# Patient Record
Sex: MALE | Race: BLACK OR AFRICAN AMERICAN | NOT HISPANIC OR LATINO | Employment: FULL TIME | URBAN - METROPOLITAN AREA
[De-identification: names, ages, dates, MRNs, and addresses within clinical notes are randomized per-mention and may not be internally consistent; named-entity substitution may affect disease eponyms.]

---

## 2023-11-19 ENCOUNTER — HOSPITAL ENCOUNTER (EMERGENCY)
Facility: HOSPITAL | Age: 33
Discharge: HOME | End: 2023-11-19
Payer: MEDICARE

## 2023-11-19 ENCOUNTER — APPOINTMENT (OUTPATIENT)
Dept: RADIOLOGY | Facility: HOSPITAL | Age: 33
End: 2023-11-19
Payer: MEDICARE

## 2023-11-19 VITALS
TEMPERATURE: 97.9 F | OXYGEN SATURATION: 97 % | WEIGHT: 225 LBS | BODY MASS INDEX: 30.48 KG/M2 | HEIGHT: 72 IN | DIASTOLIC BLOOD PRESSURE: 108 MMHG | RESPIRATION RATE: 16 BRPM | SYSTOLIC BLOOD PRESSURE: 166 MMHG | HEART RATE: 87 BPM

## 2023-11-19 DIAGNOSIS — S62.101A CLOSED FRACTURE OF RIGHT WRIST, INITIAL ENCOUNTER: Primary | ICD-10-CM

## 2023-11-19 PROCEDURE — 73130 X-RAY EXAM OF HAND: CPT | Mod: RT,FY

## 2023-11-19 PROCEDURE — 73110 X-RAY EXAM OF WRIST: CPT | Mod: RT,FY

## 2023-11-19 PROCEDURE — 29125 APPL SHORT ARM SPLINT STATIC: CPT | Mod: RT

## 2023-11-19 PROCEDURE — 73130 X-RAY EXAM OF HAND: CPT | Mod: RIGHT SIDE | Performed by: RADIOLOGY

## 2023-11-19 PROCEDURE — 73090 X-RAY EXAM OF FOREARM: CPT | Mod: RIGHT SIDE | Performed by: RADIOLOGY

## 2023-11-19 PROCEDURE — 2500000001 HC RX 250 WO HCPCS SELF ADMINISTERED DRUGS (ALT 637 FOR MEDICARE OP): Performed by: HOME HEALTH

## 2023-11-19 PROCEDURE — 94760 N-INVAS EAR/PLS OXIMETRY 1: CPT

## 2023-11-19 PROCEDURE — 99285 EMERGENCY DEPT VISIT HI MDM: CPT | Mod: 25

## 2023-11-19 PROCEDURE — 73090 X-RAY EXAM OF FOREARM: CPT | Mod: RT

## 2023-11-19 PROCEDURE — 99284 EMERGENCY DEPT VISIT MOD MDM: CPT | Mod: 25 | Performed by: HOME HEALTH

## 2023-11-19 PROCEDURE — 73110 X-RAY EXAM OF WRIST: CPT | Mod: RIGHT SIDE | Performed by: RADIOLOGY

## 2023-11-19 RX ORDER — NAPROXEN 500 MG/1
500 TABLET ORAL
Qty: 30 TABLET | Refills: 0 | Status: SHIPPED | OUTPATIENT
Start: 2023-11-19 | End: 2023-12-04

## 2023-11-19 RX ORDER — HYDROCODONE BITARTRATE AND ACETAMINOPHEN 5; 325 MG/1; MG/1
1 TABLET ORAL ONCE
Status: COMPLETED | OUTPATIENT
Start: 2023-11-19 | End: 2023-11-19

## 2023-11-19 RX ORDER — HYDROCODONE BITARTRATE AND ACETAMINOPHEN 5; 325 MG/1; MG/1
1 TABLET ORAL EVERY 6 HOURS PRN
Qty: 9 TABLET | Refills: 0 | Status: SHIPPED | OUTPATIENT
Start: 2023-11-19 | End: 2023-11-22

## 2023-11-19 RX ADMIN — HYDROCODONE BITARTRATE AND ACETAMINOPHEN 1 TABLET: 5; 325 TABLET ORAL at 17:30

## 2023-11-19 ASSESSMENT — LIFESTYLE VARIABLES
EVER FELT BAD OR GUILTY ABOUT YOUR DRINKING: NO
REASON UNABLE TO ASSESS: NO
HAVE YOU EVER FELT YOU SHOULD CUT DOWN ON YOUR DRINKING: NO
HAVE PEOPLE ANNOYED YOU BY CRITICIZING YOUR DRINKING: NO
EVER HAD A DRINK FIRST THING IN THE MORNING TO STEADY YOUR NERVES TO GET RID OF A HANGOVER: NO

## 2023-11-19 ASSESSMENT — PAIN - FUNCTIONAL ASSESSMENT: PAIN_FUNCTIONAL_ASSESSMENT: 0-10

## 2023-11-19 ASSESSMENT — PAIN DESCRIPTION - LOCATION: LOCATION: HAND

## 2023-11-19 ASSESSMENT — PAIN SCALES - GENERAL: PAINLEVEL_OUTOF10: 10 - WORST POSSIBLE PAIN

## 2023-11-19 ASSESSMENT — PAIN DESCRIPTION - ORIENTATION: ORIENTATION: RIGHT

## 2023-11-19 ASSESSMENT — PAIN DESCRIPTION - FREQUENCY: FREQUENCY: CONSTANT/CONTINUOUS

## 2023-11-19 ASSESSMENT — COLUMBIA-SUICIDE SEVERITY RATING SCALE - C-SSRS
6. HAVE YOU EVER DONE ANYTHING, STARTED TO DO ANYTHING, OR PREPARED TO DO ANYTHING TO END YOUR LIFE?: NO
2. HAVE YOU ACTUALLY HAD ANY THOUGHTS OF KILLING YOURSELF?: NO
1. IN THE PAST MONTH, HAVE YOU WISHED YOU WERE DEAD OR WISHED YOU COULD GO TO SLEEP AND NOT WAKE UP?: NO

## 2023-11-19 ASSESSMENT — PAIN DESCRIPTION - PAIN TYPE: TYPE: ACUTE PAIN

## 2023-11-19 NOTE — ED PROVIDER NOTES
HPI   Chief Complaint   Patient presents with    Hand Injury     Pt fell down a few steps and injured his right hand. Hand is swollen and bruised.       Patient is a 33-year-old male presenting to the ED with right hand pain and right wrist pain.  Patient states that a couple hours ago prior to coming to the ED he was walking on the stairs and states that he took the wrong step and fell down 1 stair.  States that he landed on his hand.  Believes that he had a fist at the time that he landed on his hand.  Currently endorsing significant pain.  No numbness or tingling distally.  No other injuries during the fall.  Denies LOC, hitting his head or anticoagulation.                          No data recorded                Patient History   No past medical history on file.  No past surgical history on file.  No family history on file.  Social History     Tobacco Use    Smoking status: Not on file    Smokeless tobacco: Not on file   Substance Use Topics    Alcohol use: Not on file    Drug use: Not on file       Physical Exam   ED Triage Vitals [11/19/23 1656]   Temp Heart Rate Resp BP   36.6 °C (97.9 °F) 87 16 (!) 166/108      SpO2 Temp Source Heart Rate Source Patient Position   97 % Temporal Monitor Sitting      BP Location FiO2 (%)     Left arm --       Physical Exam  Vitals reviewed.   Constitutional:       Appearance: Normal appearance.   HENT:      Head: Normocephalic.      Nose: Nose normal.      Mouth/Throat:      Mouth: Mucous membranes are moist.      Pharynx: Oropharynx is clear.   Eyes:      Extraocular Movements: Extraocular movements intact.      Pupils: Pupils are equal, round, and reactive to light.   Cardiovascular:      Rate and Rhythm: Normal rate and regular rhythm.      Pulses: Normal pulses.      Heart sounds: Normal heart sounds.   Pulmonary:      Effort: Pulmonary effort is normal.      Breath sounds: Normal breath sounds. No wheezing, rhonchi or rales.   Musculoskeletal:         General: Normal  range of motion.      Cervical back: Normal range of motion.      Comments: Visible swelling along the right wrist and right hand.  Active range of motion is limited due to pain.  Reproducible tenderness when palpating along the wrist joint and metacarpals of the right hand.  Active range of motion digits 1 through 5 but limited due to pain.  No snuffbox tenderness.  Cap refill less than 2 seconds.  Radial pulse 2+.   Skin:     General: Skin is warm.      Capillary Refill: Capillary refill takes less than 2 seconds.   Neurological:      General: No focal deficit present.      Mental Status: He is alert. Mental status is at baseline.   Psychiatric:         Mood and Affect: Mood normal.         Behavior: Behavior normal.         ED Course & MDM   Diagnoses as of 11/19/23 1842   Closed fracture of right wrist, initial encounter     XR hand right 3+ views   Final Result   Mildly offset acute appearing fractures of the distal radius and   ulnar styloid.             MACRO:   None        Signed by: Ac Blandon 11/19/2023 6:09 PM   Dictation workstation:   [id number]      XR wrist right 3+ views   Final Result   Mildly offset acute appearing fractures of the distal radius and   ulnar styloid.             MACRO:   None        Signed by: Ac Blandon 11/19/2023 6:09 PM   Dictation workstation:   [id number]      XR forearm right 2 views   Final Result   Mildly offset acute appearing fractures of the distal radius and   ulnar styloid.             MACRO:   None        Signed by: Ac Blandon 11/19/2023 6:09 PM   Dictation workstation:   [id number]          Medical Decision Making  Independent Historians:   Patient    MDM:   33-year-old male presenting to the ED with right wrist injury. Shared decision making was made.  Patient given [place] p.o. for pain control. Patient states that a couple hours prior to come to the ED he was walking up the stairs when he took the wrong step and fell.  Fell down 1 stair landing  directly on his right hand.  States that his fist was closed when he landed on it.  No other injuries. denies hitting his head, LOC or anticoagulation.  Denies numbness or tingling distally.  On exam patient is resting comfortably.  No tachycardia and afebrile.  Patient has obvious swelling along the right hand and right wrist.  Active range of motion of the right wrist is limited due to pain.  Full active range of motion in digits 1 through 5.  Radial, medial and ulnar nerves intact.  Cap refill less than 2 seconds.  Radial pulse 2+.  X-ray ordered to further evaluate patient's symptoms.      Radiologist interpretation of x-ray suggest mild offset acute appearing fractures of the distal radius and ulnar styloid.  Discussed this finding with the patient which she understands.   patient placed in sugar-tong splint applied by paramedics.  I did reassess the patient after placement of the splint which she was neurovascular intact distally.  Patient reports improvement of his symptoms after splint placement.  Patient was sent home with prescription of [place] p.o. for breakthrough pain peer reviewed the patient's OARRS with no active prescriptions at this time.  Send patient home with prescription of naproxen.  Advised RICE therapy.  Advised patient to [facility] as he may potentially need surgery due to the mild displacement.  Patient agrees this plan is no further questions.  Patient stable for discharge as he is neurovascular intact distally.  Diagnosed patient with closed fracture of right wrist.    DDx/Differential:  Fracture, contusion, sprain    Diagnosis: closed fracture of right wrist        Dictation Disclaimer: Due to voice recognition software, sound alike and misspelled words may be contained in documentation. If you have any questions please contact me.            Procedure  Procedures     Alessio Chapman PA-C  11/19/23 0479

## 2023-11-20 ENCOUNTER — OFFICE VISIT (OUTPATIENT)
Dept: ORTHOPEDIC SURGERY | Facility: CLINIC | Age: 33
End: 2023-11-20
Payer: MEDICARE

## 2023-11-20 DIAGNOSIS — S52.509A FRACTURE OF DISTAL END OF RADIUS WITHOUT ADDITIONAL FRACTURE: ICD-10-CM

## 2023-11-20 PROCEDURE — 99214 OFFICE O/P EST MOD 30 MIN: CPT | Mod: 57 | Performed by: STUDENT IN AN ORGANIZED HEALTH CARE EDUCATION/TRAINING PROGRAM

## 2023-11-20 PROCEDURE — 99204 OFFICE O/P NEW MOD 45 MIN: CPT | Performed by: STUDENT IN AN ORGANIZED HEALTH CARE EDUCATION/TRAINING PROGRAM

## 2023-11-20 NOTE — PROGRESS NOTES
History of Present Illness:  Presents for evaluation of right wrist.  The patient sustained an acute injury and notes pain and swelling.    The pain is sharp in nature, severe, worse with movement and better with rest.    Had mechanical fall down 1 stair yesterday.  Denies pain to remainder extremities. Was chasing son. 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    Review of Systems   GENERAL: Negative for malaise, significant weight loss, fever  MUSCULOSKELETAL: see HPI  NEURO:  Negative    The patient's past medical history, family history, social history, and review of systems were reviewed. History is otherwise negative except as stated in the HPI.    Physical Examination:  The patient appears to be their stated age, is in no apparent distress, and is oriented x3. The patients mood and affect are appropriate. The patients gait is normal. The examination of the limb in question was performed in comparison to the contralateral limb.  On musculoskeletal examination, the patient has full elbow range of motion. In regards to the wrist, there is swelling with some deformity. There is ecchymosis of the wrist, but the skin is intact. Range of motion and strength are limited secondary to pain. There is tenderness to palpation of the distal radius. There is no obvious tenderness to palpation about the scaphoid or the SL interval. Sensation and motor function are intact in the radial, ulnar, and median nerve distribution. The patient has intact flexor and extensor function, but has difficulty making a full fist secondary to pain. The hand itself is warm and well perfused. The contralateral hand and wrist are normal to inspection, range of motion, stability, and strength.      Imaging:  AP, lateral, and oblique radiographs of the right wrist demonstrate a displaced radial styloid fracture.    Assessment:  Patient with a displaced radial styloid fracture    Plan:  I had a long conversation with the patient regarding their distal radius  fracture. I discussed the risks/benefits of both non-operative and operative management. Based on the displacement and instability of the fracture, the patient has elected operative fixation. I reviewed the details of the operation and of the post-operative course. The benefit of surgery would be restoration of alignment to allow for normal function. The risks of surgery include, but are not limited to, infection, bleeding, nerve/vessel/tendon injury, stiffness, malunion, nonunion, hardware complications, and anesthetic complications. The patient understood the risks/benefits and consented to surgery. I will schedule them in the near future. Today, I have placed them back in a splint and have advised strict elevation until the time of surgery. I have answered all of their questions regarding the procedure. Out of work for 8 weeks.    Jodie Ramirez MD

## 2023-11-24 ENCOUNTER — HOSPITAL ENCOUNTER (OUTPATIENT)
Age: 33
Discharge: HOME | End: 2023-11-24
Attending: STUDENT IN AN ORGANIZED HEALTH CARE EDUCATION/TRAINING PROGRAM | Admitting: STUDENT IN AN ORGANIZED HEALTH CARE EDUCATION/TRAINING PROGRAM
Payer: COMMERCIAL

## 2023-11-24 ENCOUNTER — ANESTHESIA EVENT (OUTPATIENT)
Dept: OPERATING ROOM | Age: 33
End: 2023-11-24
Payer: COMMERCIAL

## 2023-11-24 ENCOUNTER — APPOINTMENT (OUTPATIENT)
Dept: GENERAL RADIOLOGY | Age: 33
End: 2023-11-24
Attending: STUDENT IN AN ORGANIZED HEALTH CARE EDUCATION/TRAINING PROGRAM
Payer: COMMERCIAL

## 2023-11-24 ENCOUNTER — ANESTHESIA (OUTPATIENT)
Dept: OPERATING ROOM | Age: 33
End: 2023-11-24
Payer: COMMERCIAL

## 2023-11-24 VITALS
DIASTOLIC BLOOD PRESSURE: 95 MMHG | HEIGHT: 72 IN | TEMPERATURE: 97.6 F | SYSTOLIC BLOOD PRESSURE: 138 MMHG | BODY MASS INDEX: 30.48 KG/M2 | RESPIRATION RATE: 18 BRPM | WEIGHT: 225 LBS | OXYGEN SATURATION: 99 % | HEART RATE: 70 BPM

## 2023-11-24 DIAGNOSIS — R52 PAIN: ICD-10-CM

## 2023-11-24 DIAGNOSIS — S52.571A OTHER CLOSED INTRA-ARTICULAR FRACTURE OF DISTAL END OF RIGHT RADIUS, INITIAL ENCOUNTER: Primary | ICD-10-CM

## 2023-11-24 PROBLEM — Z41.9 SURGERY, ELECTIVE: Status: ACTIVE | Noted: 2023-11-24

## 2023-11-24 PROCEDURE — C1769 GUIDE WIRE: HCPCS | Performed by: STUDENT IN AN ORGANIZED HEALTH CARE EDUCATION/TRAINING PROGRAM

## 2023-11-24 PROCEDURE — 6370000000 HC RX 637 (ALT 250 FOR IP): Performed by: ANESTHESIOLOGY

## 2023-11-24 PROCEDURE — 3700000001 HC ADD 15 MINUTES (ANESTHESIA): Performed by: STUDENT IN AN ORGANIZED HEALTH CARE EDUCATION/TRAINING PROGRAM

## 2023-11-24 PROCEDURE — 2500000003 HC RX 250 WO HCPCS: Performed by: STUDENT IN AN ORGANIZED HEALTH CARE EDUCATION/TRAINING PROGRAM

## 2023-11-24 PROCEDURE — A4217 STERILE WATER/SALINE, 500 ML: HCPCS | Performed by: STUDENT IN AN ORGANIZED HEALTH CARE EDUCATION/TRAINING PROGRAM

## 2023-11-24 PROCEDURE — 2709999900 HC NON-CHARGEABLE SUPPLY: Performed by: STUDENT IN AN ORGANIZED HEALTH CARE EDUCATION/TRAINING PROGRAM

## 2023-11-24 PROCEDURE — 6360000002 HC RX W HCPCS: Performed by: STUDENT IN AN ORGANIZED HEALTH CARE EDUCATION/TRAINING PROGRAM

## 2023-11-24 PROCEDURE — 6360000002 HC RX W HCPCS: Performed by: REGISTERED NURSE

## 2023-11-24 PROCEDURE — 2580000003 HC RX 258: Performed by: STUDENT IN AN ORGANIZED HEALTH CARE EDUCATION/TRAINING PROGRAM

## 2023-11-24 PROCEDURE — 6360000002 HC RX W HCPCS: Performed by: ANESTHESIOLOGY

## 2023-11-24 PROCEDURE — 7100000001 HC PACU RECOVERY - ADDTL 15 MIN: Performed by: STUDENT IN AN ORGANIZED HEALTH CARE EDUCATION/TRAINING PROGRAM

## 2023-11-24 PROCEDURE — 7100000010 HC PHASE II RECOVERY - FIRST 15 MIN: Performed by: STUDENT IN AN ORGANIZED HEALTH CARE EDUCATION/TRAINING PROGRAM

## 2023-11-24 PROCEDURE — 3700000000 HC ANESTHESIA ATTENDED CARE: Performed by: STUDENT IN AN ORGANIZED HEALTH CARE EDUCATION/TRAINING PROGRAM

## 2023-11-24 PROCEDURE — C1713 ANCHOR/SCREW BN/BN,TIS/BN: HCPCS | Performed by: STUDENT IN AN ORGANIZED HEALTH CARE EDUCATION/TRAINING PROGRAM

## 2023-11-24 PROCEDURE — 2720000010 HC SURG SUPPLY STERILE: Performed by: STUDENT IN AN ORGANIZED HEALTH CARE EDUCATION/TRAINING PROGRAM

## 2023-11-24 PROCEDURE — 3600000014 HC SURGERY LEVEL 4 ADDTL 15MIN: Performed by: STUDENT IN AN ORGANIZED HEALTH CARE EDUCATION/TRAINING PROGRAM

## 2023-11-24 PROCEDURE — 3600000004 HC SURGERY LEVEL 4 BASE: Performed by: STUDENT IN AN ORGANIZED HEALTH CARE EDUCATION/TRAINING PROGRAM

## 2023-11-24 PROCEDURE — 7100000011 HC PHASE II RECOVERY - ADDTL 15 MIN: Performed by: STUDENT IN AN ORGANIZED HEALTH CARE EDUCATION/TRAINING PROGRAM

## 2023-11-24 PROCEDURE — 25608 OPTX DST RD XART FX/EPI SEP2: CPT | Performed by: STUDENT IN AN ORGANIZED HEALTH CARE EDUCATION/TRAINING PROGRAM

## 2023-11-24 PROCEDURE — 7100000000 HC PACU RECOVERY - FIRST 15 MIN: Performed by: STUDENT IN AN ORGANIZED HEALTH CARE EDUCATION/TRAINING PROGRAM

## 2023-11-24 DEVICE — IMPLANTABLE DEVICE: Type: IMPLANTABLE DEVICE | Status: FUNCTIONAL

## 2023-11-24 RX ORDER — SODIUM CHLORIDE 0.9 % (FLUSH) 0.9 %
5-40 SYRINGE (ML) INJECTION EVERY 12 HOURS SCHEDULED
Status: DISCONTINUED | OUTPATIENT
Start: 2023-11-24 | End: 2023-11-24 | Stop reason: HOSPADM

## 2023-11-24 RX ORDER — NAPROXEN 500 MG/1
500 TABLET ORAL 2 TIMES DAILY WITH MEALS
Qty: 30 TABLET | Refills: 0 | COMMUNITY
Start: 2023-11-19 | End: 2023-12-04

## 2023-11-24 RX ORDER — SODIUM CHLORIDE 9 MG/ML
INJECTION, SOLUTION INTRAVENOUS PRN
Status: DISCONTINUED | OUTPATIENT
Start: 2023-11-24 | End: 2023-11-24 | Stop reason: HOSPADM

## 2023-11-24 RX ORDER — ONDANSETRON 2 MG/ML
4 INJECTION INTRAMUSCULAR; INTRAVENOUS
Status: DISCONTINUED | OUTPATIENT
Start: 2023-11-24 | End: 2023-11-24 | Stop reason: HOSPADM

## 2023-11-24 RX ORDER — HYDROCODONE BITARTRATE AND ACETAMINOPHEN 5; 325 MG/1; MG/1
1 TABLET ORAL EVERY 6 HOURS PRN
Status: ON HOLD | COMMUNITY
End: 2023-11-24 | Stop reason: HOSPADM

## 2023-11-24 RX ORDER — PROPOFOL 10 MG/ML
INJECTION, EMULSION INTRAVENOUS PRN
Status: DISCONTINUED | OUTPATIENT
Start: 2023-11-24 | End: 2023-11-24 | Stop reason: SDUPTHER

## 2023-11-24 RX ORDER — MEPERIDINE HYDROCHLORIDE 25 MG/ML
12.5 INJECTION INTRAMUSCULAR; INTRAVENOUS; SUBCUTANEOUS
Status: DISCONTINUED | OUTPATIENT
Start: 2023-11-24 | End: 2023-11-24 | Stop reason: HOSPADM

## 2023-11-24 RX ORDER — FENTANYL CITRATE 50 UG/ML
INJECTION, SOLUTION INTRAMUSCULAR; INTRAVENOUS PRN
Status: DISCONTINUED | OUTPATIENT
Start: 2023-11-24 | End: 2023-11-24 | Stop reason: SDUPTHER

## 2023-11-24 RX ORDER — METOCLOPRAMIDE HYDROCHLORIDE 5 MG/ML
10 INJECTION INTRAMUSCULAR; INTRAVENOUS
Status: DISCONTINUED | OUTPATIENT
Start: 2023-11-24 | End: 2023-11-24 | Stop reason: HOSPADM

## 2023-11-24 RX ORDER — ONDANSETRON 4 MG/1
4 TABLET, FILM COATED ORAL 3 TIMES DAILY PRN
Qty: 15 TABLET | Refills: 0 | Status: SHIPPED | OUTPATIENT
Start: 2023-11-24

## 2023-11-24 RX ORDER — MAGNESIUM HYDROXIDE 1200 MG/15ML
LIQUID ORAL CONTINUOUS PRN
Status: DISCONTINUED | OUTPATIENT
Start: 2023-11-24 | End: 2023-11-24 | Stop reason: HOSPADM

## 2023-11-24 RX ORDER — SODIUM CHLORIDE 9 MG/ML
INJECTION, SOLUTION INTRAVENOUS CONTINUOUS
Status: DISCONTINUED | OUTPATIENT
Start: 2023-11-24 | End: 2023-11-24 | Stop reason: HOSPADM

## 2023-11-24 RX ORDER — OXYCODONE HYDROCHLORIDE 5 MG/1
5 TABLET ORAL
Status: COMPLETED | OUTPATIENT
Start: 2023-11-24 | End: 2023-11-24

## 2023-11-24 RX ORDER — BUPIVACAINE HYDROCHLORIDE 5 MG/ML
INJECTION, SOLUTION EPIDURAL; INTRACAUDAL PRN
Status: DISCONTINUED | OUTPATIENT
Start: 2023-11-24 | End: 2023-11-24 | Stop reason: HOSPADM

## 2023-11-24 RX ORDER — SODIUM CHLORIDE 0.9 % (FLUSH) 0.9 %
5-40 SYRINGE (ML) INJECTION PRN
Status: DISCONTINUED | OUTPATIENT
Start: 2023-11-24 | End: 2023-11-24 | Stop reason: HOSPADM

## 2023-11-24 RX ORDER — FENTANYL CITRATE 0.05 MG/ML
50 INJECTION, SOLUTION INTRAMUSCULAR; INTRAVENOUS EVERY 10 MIN PRN
Status: DISCONTINUED | OUTPATIENT
Start: 2023-11-24 | End: 2023-11-24 | Stop reason: HOSPADM

## 2023-11-24 RX ORDER — MIDAZOLAM HYDROCHLORIDE 1 MG/ML
INJECTION INTRAMUSCULAR; INTRAVENOUS PRN
Status: DISCONTINUED | OUTPATIENT
Start: 2023-11-24 | End: 2023-11-24 | Stop reason: SDUPTHER

## 2023-11-24 RX ORDER — DIPHENHYDRAMINE HYDROCHLORIDE 50 MG/ML
12.5 INJECTION INTRAMUSCULAR; INTRAVENOUS
Status: DISCONTINUED | OUTPATIENT
Start: 2023-11-24 | End: 2023-11-24 | Stop reason: HOSPADM

## 2023-11-24 RX ORDER — OXYCODONE HYDROCHLORIDE AND ACETAMINOPHEN 5; 325 MG/1; MG/1
1 TABLET ORAL EVERY 6 HOURS PRN
Qty: 20 TABLET | Refills: 0 | Status: SHIPPED | OUTPATIENT
Start: 2023-11-24 | End: 2023-12-01

## 2023-11-24 RX ORDER — LIDOCAINE HYDROCHLORIDE AND EPINEPHRINE 10; 10 MG/ML; UG/ML
INJECTION, SOLUTION INFILTRATION; PERINEURAL PRN
Status: DISCONTINUED | OUTPATIENT
Start: 2023-11-24 | End: 2023-11-24 | Stop reason: HOSPADM

## 2023-11-24 RX ADMIN — PROPOFOL 100 MG: 10 INJECTION, EMULSION INTRAVENOUS at 11:15

## 2023-11-24 RX ADMIN — MIDAZOLAM HYDROCHLORIDE 2 MG: 1 INJECTION, SOLUTION INTRAMUSCULAR; INTRAVENOUS at 10:53

## 2023-11-24 RX ADMIN — HYDROMORPHONE HYDROCHLORIDE 0.5 MG: 1 INJECTION, SOLUTION INTRAMUSCULAR; INTRAVENOUS; SUBCUTANEOUS at 12:05

## 2023-11-24 RX ADMIN — SODIUM CHLORIDE: 9 INJECTION, SOLUTION INTRAVENOUS at 10:53

## 2023-11-24 RX ADMIN — FENTANYL CITRATE 50 MCG: 50 INJECTION, SOLUTION INTRAMUSCULAR; INTRAVENOUS at 11:03

## 2023-11-24 RX ADMIN — PROPOFOL 100 MG: 10 INJECTION, EMULSION INTRAVENOUS at 11:07

## 2023-11-24 RX ADMIN — HYDROMORPHONE HYDROCHLORIDE 0.5 MG: 1 INJECTION, SOLUTION INTRAMUSCULAR; INTRAVENOUS; SUBCUTANEOUS at 11:46

## 2023-11-24 RX ADMIN — PROPOFOL 100 MG: 10 INJECTION, EMULSION INTRAVENOUS at 10:56

## 2023-11-24 RX ADMIN — FENTANYL CITRATE 50 MCG: 50 INJECTION, SOLUTION INTRAMUSCULAR; INTRAVENOUS at 10:53

## 2023-11-24 RX ADMIN — OXYCODONE 5 MG: 5 TABLET ORAL at 12:51

## 2023-11-24 RX ADMIN — CEFAZOLIN 2000 MG: 2 INJECTION, POWDER, FOR SOLUTION INTRAMUSCULAR; INTRAVENOUS at 11:00

## 2023-11-24 RX ADMIN — PROPOFOL 100 MCG/KG/MIN: 10 INJECTION, EMULSION INTRAVENOUS at 10:55

## 2023-11-24 ASSESSMENT — PAIN SCALES - GENERAL
PAINLEVEL_OUTOF10: 6
PAINLEVEL_OUTOF10: 7
PAINLEVEL_OUTOF10: 7
PAINLEVEL_OUTOF10: 8
PAINLEVEL_OUTOF10: 7
PAINLEVEL_OUTOF10: 5
PAINLEVEL_OUTOF10: 6

## 2023-11-24 ASSESSMENT — PAIN DESCRIPTION - ORIENTATION
ORIENTATION: RIGHT

## 2023-11-24 ASSESSMENT — PAIN DESCRIPTION - LOCATION
LOCATION: WRIST
LOCATION: HAND
LOCATION: WRIST

## 2023-11-24 ASSESSMENT — PAIN DESCRIPTION - DESCRIPTORS
DESCRIPTORS: THROBBING
DESCRIPTORS: JABBING;ACHING
DESCRIPTORS: THROBBING;SHARP

## 2023-11-24 ASSESSMENT — PAIN - FUNCTIONAL ASSESSMENT: PAIN_FUNCTIONAL_ASSESSMENT: 0-10

## 2023-11-24 ASSESSMENT — PAIN DESCRIPTION - PAIN TYPE: TYPE: SURGICAL PAIN

## 2023-11-24 NOTE — PROGRESS NOTES
CLINICAL PHARMACY NOTE: MEDS TO BEDS    Total # of Prescriptions Filled: 2   The following medications were delivered to the patient:  Ondansetron 4mg tab  Oxycodone/APAP 5-325mg tab    Additional Documentation:

## 2023-11-24 NOTE — OP NOTE
Operative Note      Patient: Charles Echeverria  YOB: 1990  MRN: [id number]    Date of Procedure: 11/24/2023    Pre-Op Diagnosis Codes:     * Closed fracture of distal end of right radius, unspecified fracture morphology, initial encounter [S52.501A]    Post-Op Diagnosis: Post-Op Diagnosis Codes:     * Closed fracture of distal end of right radius, unspecified fracture morphology, initial encounter [S52.501A]       Procedure(s):  RIGHT WRIST OPEN REDUCTION INTERNAL FIXATION RIGHT DISTAL RADIUS FRACTURE,    Surgeon(s):  Johnny Pope MD    Assistant:   First Assistant: Jose Tomas    Anesthesia: General    Estimated Blood Loss (mL): Minimal    Complications: None      Implants:  Implant Name Type Inv. Item Serial No.  Lot No. LRB No. Used Action   SCREW BNE LNG THRD 4X36 MM 14 MM SHELL HDLSS COMPR TI - [id number]  SCREW BNE LNG THRD 4X36 MM 14 MM SHELL HDLSS COMPR TI  [employer]  Right 1 Implanted   SCREW BNE HDLSS LNG THRD [id number] MM 13 MM SHELL ST SD TI GRN NS - [id number]  SCREW BNE HDLSS LNG THRD 7J25 MM 13 MM SHELL ST SD TI GRN NS  [employer]  Right 1 Implanted           Findings: Right intra-articular distal radius fracture, 2 parts    Detailed Description of Procedure:   Patient was brought to the operating room transferred the OR table. Hand table attached. Tourniquet placed into his upper arm. Timeout performed with the team verifying the right side as the correct side. Hand was prepped and draped in standard sterile fashion. A 2 cm incision was made over the radial styloid. Care was taken to ensure the radial sensory nerve was not in the field. A reduction was performed with manual traction and ulnar deviation. Under fluoroscopy a reduction was verified. A 1.6 K wire was placed from the radial styloid crossing the fracture into the proximal radial metaphysis. An additional K wire was then placed.   Both of these were measured and found to be 32 and 36mm

## 2023-11-24 NOTE — H&P
Update History & Physical    The patient's History and Physical of November 20, 2023 was reviewed with the patient and I examined the patient. There was no change. The surgical site was confirmed by the patient and me. Plan: The risks, benefits, expected outcome, and alternative to the recommended procedure have been discussed with the patient. Patient understands and wants to proceed with the procedure.      Electronically signed by Demetri Gaspar MD on 11/24/2023 at 10:31 AM

## 2023-11-24 NOTE — ANESTHESIA POSTPROCEDURE EVALUATION
Department of Anesthesiology  Postprocedure Note    Patient: Yohannes Kelley  MRN: [id number]  YOB: 1990  Date of evaluation: 11/24/2023      Procedure Summary     Date: 11/24/23 Room / Location: [address]    Anesthesia Start: 1053 Anesthesia Stop: 1143    Procedure: RIGHT WRIST OPEN REDUCTION INTERNAL FIXATION RIGHT DISTAL RADIUS FRACTURE, (Right) Diagnosis:       Closed fracture of distal end of right radius, unspecified fracture morphology, initial encounter      (Closed fracture of distal end of right radius, unspecified fracture morphology, initial encounter Zoë Giles)    Surgeons: Robert Bustamante MD Responsible Provider: King Daniel MD    Anesthesia Type: general ASA Status: 2          Anesthesia Type: No value filed.     Lisa Phase I: Lisa Score: 10    Lisa Phase II:        Anesthesia Post Evaluation    Patient location during evaluation: bedside  Patient participation: complete - patient participated  Level of consciousness: awake and awake and alert  Airway patency: patent  Nausea & Vomiting: no nausea and no vomiting  Complications: no  Cardiovascular status: blood pressure returned to baseline and hemodynamically stable  Respiratory status: acceptable  Hydration status: euvolemic  Pain management: adequate

## 2023-12-08 ENCOUNTER — APPOINTMENT (OUTPATIENT)
Dept: ORTHOPEDIC SURGERY | Facility: CLINIC | Age: 33
End: 2023-12-08
Payer: MEDICARE

## 2023-12-08 ENCOUNTER — ANCILLARY PROCEDURE (OUTPATIENT)
Dept: RADIOLOGY | Facility: CLINIC | Age: 33
End: 2023-12-08
Payer: MEDICARE

## 2023-12-08 ENCOUNTER — OFFICE VISIT (OUTPATIENT)
Dept: ORTHOPEDIC SURGERY | Facility: CLINIC | Age: 33
End: 2023-12-08
Payer: MEDICARE

## 2023-12-08 DIAGNOSIS — S52.509A FRACTURE OF DISTAL END OF RADIUS WITHOUT ADDITIONAL FRACTURE: ICD-10-CM

## 2023-12-08 PROCEDURE — 73110 X-RAY EXAM OF WRIST: CPT | Mod: RIGHT SIDE | Performed by: STUDENT IN AN ORGANIZED HEALTH CARE EDUCATION/TRAINING PROGRAM

## 2023-12-08 PROCEDURE — L3908 WHO COCK-UP NONMOLDE PRE OTS: HCPCS | Performed by: STUDENT IN AN ORGANIZED HEALTH CARE EDUCATION/TRAINING PROGRAM

## 2023-12-08 PROCEDURE — 99024 POSTOP FOLLOW-UP VISIT: CPT | Performed by: STUDENT IN AN ORGANIZED HEALTH CARE EDUCATION/TRAINING PROGRAM

## 2023-12-08 PROCEDURE — 73110 X-RAY EXAM OF WRIST: CPT | Mod: RT,FY

## 2023-12-08 NOTE — PROGRESS NOTES
S/p right distal radius ORIF. Doing well. Denies numbness or tingling.     Physical Examination:  The patient appears to be their stated age, is in no apparent distress, and is oriented x3. The patients mood and affect are appropriate. The patients gait is normal. The examination of the limb in question was performed in comparison to the contralateral limb.    Dressing removed  Incision c/d/I  AIN, PIN, ulnar intact  SILT A/R/U/M  Hand wwp    Radiographs  Demonstrate a right radial styloid status post reduction and screw placement without evidence of.  Hardware lucencies or fracture migration.    Assessment:  2  weeks post op    Plan:   Splint removed and brace applied today.  I would like him to wear this when out of the house.  I would like him to remove and work on range of motion as able.  OT also provided.  He will be out of work for an additional 4 weeks while we work through stiffness and while he is nonweightbearing.    Jodie Parsons MD

## 2023-12-08 NOTE — LETTER
December 8, 2023     Patient: Kulwinder Story Jr.   YOB: 1990   Date of Visit: 12/8/2023       To Whom It May Concern:    It is my medical opinion that Kulwinder Story should remain out of work until his follow up appointment .    If you have any questions or concerns, please don't hesitate to call.         Sincerely,        Jodie Ramirez MD    CC: No Recipients

## 2023-12-12 ENCOUNTER — APPOINTMENT (OUTPATIENT)
Dept: OCCUPATIONAL THERAPY | Facility: CLINIC | Age: 33
End: 2023-12-12
Payer: MEDICARE

## 2023-12-28 ENCOUNTER — EVALUATION (OUTPATIENT)
Dept: OCCUPATIONAL THERAPY | Facility: CLINIC | Age: 33
End: 2023-12-28
Payer: MEDICARE

## 2023-12-28 DIAGNOSIS — S52.509A FRACTURE OF DISTAL END OF RADIUS WITHOUT ADDITIONAL FRACTURE: ICD-10-CM

## 2023-12-28 DIAGNOSIS — S62.101D WRIST FRACTURE, CLOSED, RIGHT, WITH ROUTINE HEALING, SUBSEQUENT ENCOUNTER: Primary | ICD-10-CM

## 2023-12-28 PROCEDURE — 97140 MANUAL THERAPY 1/> REGIONS: CPT | Mod: GO | Performed by: OCCUPATIONAL THERAPIST

## 2023-12-28 PROCEDURE — 97165 OT EVAL LOW COMPLEX 30 MIN: CPT | Mod: GO | Performed by: OCCUPATIONAL THERAPIST

## 2023-12-28 PROCEDURE — 97110 THERAPEUTIC EXERCISES: CPT | Mod: GO | Performed by: OCCUPATIONAL THERAPIST

## 2023-12-28 ASSESSMENT — PAIN - FUNCTIONAL ASSESSMENT: PAIN_FUNCTIONAL_ASSESSMENT: 0-10

## 2023-12-28 ASSESSMENT — PAIN DESCRIPTION - DESCRIPTORS: DESCRIPTORS: SHARP

## 2023-12-28 ASSESSMENT — PAIN SCALES - GENERAL: PAINLEVEL_OUTOF10: 5 - MODERATE PAIN

## 2023-12-28 NOTE — PROGRESS NOTES
Occupational Therapy   Upper Extremity Evaluation Note    Patient Name: Kulwinder Story Jr.  MRN: [id number]  Referring  Physician: Dr. Jodie Ramirez  Time Calculation Time Calculation  Start Time: 0220  Stop Time: 0300  Time Calculation (min): 40 min     Current Problem  1. Wrist fracture, closed, right, with routine healing, subsequent encounter  Follow Up In Occupational Therapy      2. Fracture of distal end of radius without additional fracture  Referral to Occupational Therapy        DC OPEN RDL SHAFT FX CLOSED RAD/ULN JT DISLOCATE   RIGHT WRIST OPEN REDUCTION INTERNAL FIXATION RIGHT DISTAL RADIUS   FRACTURE, SUPINE, SYNTHES EQUIPMENT- CANNULATED SCREWS     OP Note: A 2 cm incision was made over the radial styloid. Care was taken to ensure the radial sensory nerve was not in the field. A reduction was performed with manual traction and ulnar deviation. Under fluoroscopy a reduction was verified. A 1.6 K wire was placed from the radial styloid crossing the fracture into the proximal radial metaphysis. An additional K wire was then placed. Both of these were measured and found to be 32 and 36mm , respectively . after this headless compression screws from the [employer] set were open and the first K wire was overdrilled. The 36 mm screw was placed under fluoroscopy. Appropriate compression across the fracture site was visualized. The second screw was then overdrilled and a 32 mm screw placed across the fracture site.     Date of Injury:  11/18/23  Date of Surgery: 11/24/23    Precautions: WBAT  Allergies:       None Reported          Insurance    Visit number: 1/8  Approved number of visits: BOA  Authorization required prior to treatment: Yes  Insurance Company: [employer]// BOA COPAY 0 DED 0 COVERAGE 100  OOP 0 AUTH REQ AFTER IE      Subjective  Patient would like to functionally improve ability to use hand tools at work, do push ups, and wash his back.     Pain  Pain Assessment  Pain Assessment:  0-10  Pain Score: 5 - Moderate pain  Pain Descriptors: Sharp        Objective  Hand Dominance: Right    Outcome Measures:   DASH Score: 36.36    ADLS/IADLS: Can't bend to wash back. Unable to complete gay hygiene.     Skin/ Wound/Scar: Flat scar, non-adherent    Sensation: WFL    Dexterity/ Coordination: To be evaluated as time permits at future session when medically appropriate.          AROM  Right Left   Forearm  Pronation/Supination 55/45 -      -   Wrist Extension/Flexion 45/45 -    Radial Deviation/Ulnar Deviation 10/15 -         Hand ROM  AROM   THUMB      [place] 7/10    Palmar Abduction TBE    Radial Abduction TBE     Hand AROM WNL  Strength deferred  Gross Grasp (lbs)  Right Left   2nd setting - -       Pinch Strength Right Left   Lateral - -   Tripod - -   Tip  - -       Edema (cm):    Right Left   WRIST DWC 19.0 16.6       Treatment Completed: 45 minutes    Low Evaluation 15 minutes    [place] 49694 time minutes, 0    Therapeutic exercise (38544): timed minutes 10  -AROM forearm, wrist, hand including tendon glides  -prayer stretch      Manual Therapy (20495): timed minutes 10  -Edema Management including elevation, cryotherapy, Compression   -Scar mobilization  -Soft Tissue Mobilization     HEP and Patient Education:       -prayer stretch  -AROM forearm, wrist. digits 15-20 reps each, 2 to 4x/day  -edema management daily  -scar and STM daily  -Educated patient to diagnosis and rehab expectations including frequency and duration of services.     Assessment:  Evaluation Data: Patient is a 32 yo RHDM  s/p Right radius and ulna fracture from a fall inside the home and he fell asleep on limb. Noted DRUJ dislocation on imaging data resulting in limited participation in pain-free ADLs and inability to perform at their prior level of function. Skilled Occupational Therapy services are warranted to address the impairments found & listed previously in the objective section in order to return to safe and  pain-free ADLs and prior level of function and to to realize measurable change in the outcome measures and achieve improvements in patient’s functional status and individual goals.        Patient resides independently with family. Patient enjoys hobbies including time with family.   Patient would like to gain recovery of their RIGHT hand function to improve their level of independence with ADLs and IADLs,     specifically using work tools and returning to work as a .     Plan      Follow Up with Referring Physician: 1/5/23  Monitor home program.    Patient instructed to call if problems.      Frequency and duration: 1 time(s) a week, for 8 weeks, for 8 visits .   Comprehensive reassessments will be completed intermittently.   Potential to achieve rehab goals is good . Patient would benefit from skilled Occupational Therapy services to address deficits and promote functional gains and return to timely completion of self care demands and IADL's.       GOALS:   Patient to demonstrate, with involved extremity    -good skill with progressive edema reduction technique facilitating gains with motion and function.   -forearm AROM MANLEY 120 degrees, to carry and place dishware as desired and/or steer vehicle.   -wrist AROM MANLEY 120 degrees, to tolerate four point position during cleaning and shifting body position(s).  -finger AROM MANLEY 220 degrees, to maintain grasp on ADL objects during use.  -independence opening packages, key pinch 16#.  -independence opening jars,  strength 70#.  -good skill with progressive orthosis regimen, applying orthosis correctly and using according to medically necessary wear schedule as prescribed by therapist.    Patient verbalized understanding and is in agreement with Occupational Therapy Goals and the plan of care.     Problems To Be Addressed: Knowledge of precautions, knowledge of HEP, pain and edema management, ROM/joint mobility, coordination, motor skills, strength recovery,  endurance recovery, orthosis use, wear/care, precautions.    Planned Interventions include: wound care as needed, patient education/instruction, home program instruction and review, edema control, manual therapy, motor coordination, therapeutic exercise, therapeutic activities, ADL and IADL retraining, neuromuscular re-education, dry needling, NMES, Fluidotherapy, ultrasound, Kinesiotaping, orthosis fabrication/fit training.

## 2024-01-04 ENCOUNTER — APPOINTMENT (OUTPATIENT)
Dept: OCCUPATIONAL THERAPY | Facility: CLINIC | Age: 34
End: 2024-01-04
Payer: MEDICARE

## 2024-01-05 ENCOUNTER — APPOINTMENT (OUTPATIENT)
Dept: ORTHOPEDIC SURGERY | Facility: CLINIC | Age: 34
End: 2024-01-05
Payer: MEDICARE

## 2024-01-10 ENCOUNTER — TREATMENT (OUTPATIENT)
Dept: OCCUPATIONAL THERAPY | Facility: CLINIC | Age: 34
End: 2024-01-10
Payer: MEDICARE

## 2024-01-10 DIAGNOSIS — S62.101D WRIST FRACTURE, CLOSED, RIGHT, WITH ROUTINE HEALING, SUBSEQUENT ENCOUNTER: Primary | ICD-10-CM

## 2024-01-10 PROCEDURE — 97140 MANUAL THERAPY 1/> REGIONS: CPT | Mod: GO | Performed by: OCCUPATIONAL THERAPIST

## 2024-01-10 PROCEDURE — 97110 THERAPEUTIC EXERCISES: CPT | Mod: GO | Performed by: OCCUPATIONAL THERAPIST

## 2024-01-10 ASSESSMENT — PAIN SCALES - GENERAL: PAINLEVEL_OUTOF10: 6

## 2024-01-10 ASSESSMENT — PAIN DESCRIPTION - DESCRIPTORS: DESCRIPTORS: SHARP

## 2024-01-10 ASSESSMENT — PAIN - FUNCTIONAL ASSESSMENT: PAIN_FUNCTIONAL_ASSESSMENT: 0-10

## 2024-01-10 NOTE — PROGRESS NOTES
Occupational Therapy   Upper Extremity Progress Note    Patient Name: Kulwinder Story Jr.  MRN: [id number]  Referring  Physician: Dr. Jodie Ramirez  Time Calculation Time Calculation  Start Time: 0935  Stop Time: 1015  Time Calculation (min): 40 min       [place] OPEN RDL SHAFT FX CLOSED RAD/ULN JT DISLOCATE   RIGHT WRIST OPEN REDUCTION INTERNAL FIXATION RIGHT DISTAL RADIUS   FRACTURE, SUPINE, SYNTHES EQUIPMENT- CANNULATED SCREWS     OP Note: A 2 cm incision was made over the radial styloid. Care was taken to ensure the radial sensory nerve was not in the field. A reduction was performed with manual traction and ulnar deviation. Under fluoroscopy a reduction was verified. A 1.6 K wire was placed from the radial styloid crossing the fracture into the proximal radial metaphysis. An additional K wire was then placed. Both of these were measured and found to be 32 and 36mm , respectively . after this headless compression screws from the [employer] set were open and the first K wire was overdrilled. The 36 mm screw was placed under fluoroscopy. Appropriate compression across the fracture site was visualized. The second screw was then overdrilled and a 32 mm screw placed across the fracture site.     Date of Injury:  11/18/23  Date of Surgery: 11/24/23    Precautions: WBAT  Allergies:       None Reported          Insurance    Visit number: 2/8  Approved number of visits: BOA  Authorization required prior to treatment: Yes  Insurance Company: [email or web address]// BOA COPAY 0 DED 0 COVERAGE 100  OOP 0 AUTH REQ AFTER IE      Subjective  Patient reports when he puts pressure into palm he has significant discomfort along dorsal carpus. Patient reports difficulty opening containers.   Patient would like to functionally improve ability to use hand tools at work, do push ups, and wash his back.     Pain  Pain Assessment  Pain Assessment: 0-10  Pain Score: 6  Pain Descriptors: Sharp        Objective  Hand Dominance: Right    Outcome  Measures:   DASH Score: 36.36     Skin/ Wound/Scar: Flat scar, non-adherent    Sensation: WFL    Dexterity/ Coordination: To be evaluated as needed. Presently patient reports good skill with ADL fasteners.       AROM  Right Left   Forearm  Pronation/Supination 70/65 -      -   Wrist Extension/Flexion 50/45 -    Radial Deviation/Ulnar Deviation 10/23 -         Hand ROM  AROM   THUMB      Kapandji 10/10    Palmar Abduction TBE    Radial Abduction TBE     Hand AROM WNL  Strength deferred  Gross Grasp (lbs)  Right Left   2nd setting 42 102       Pinch Strength Right Left   Lateral 20 14   Tripod - -   Tip  - -       Edema (cm):    Right Left   WRIST DWC 19.0 16.6       Treatment Completed: 40 minutes    Paraffin 05836 time minutes, 0    Therapeutic exercise (08126): timed minutes 10  -measured ROM and strength  -AROM forearm, wrist, hand including tendon glides  -prayer stretch  -1# weight with AROM fluido P/S and wrist motions  -WB edge of table after Jt mob and IASTM      Manual Therapy (83179): timed minutes 10  Jt mob and IASTM with guasha tools    HEP and Patient Education:       -WB level 3 putty edge of table, gentle       -IASTM dorsal carpus, issued guasha tool      -prayer stretch  -AROM forearm, wrist. digits 15-20 reps each, 2 to 4x/day  -edema management daily  -scar and STM daily  -Educated patient to diagnosis and rehab expectations including frequency and duration of services.     Assessment: Discomfort along dorsal carpus especially R-C during loaded as well as unloaded wrist extension.  strength and pinch strength as expected for stage of recovery.     Evaluation Data: Patient is a 32 yo RHDM  s/p Right radius and ulna fracture from a fall inside the home and he fell asleep on limb. Noted DRUJ dislocation on imaging data resulting in limited participation in pain-free ADLs and inability to perform at their prior level of function. Skilled Occupational Therapy services are warranted to address the  impairments found & listed previously in the objective section in order to return to safe and pain-free ADLs and prior level of function and to to realize measurable change in the outcome measures and achieve improvements in patient’s functional status and individual goals.      Patient resides independently with family. Patient enjoys hobbies including time with family.   Patient would like to gain recovery of their RIGHT hand function to improve their level of independence with ADLs and IADLs, specifically using work tools and returning to work as a .     Plan      Follow Up with Referring Physician: 1/5/23  Monitor home program.    Patient instructed to call if problems.      Frequency and duration: 1 time(s) a week, for 8 weeks, for 8 visits .   Comprehensive reassessments will be completed intermittently.   Potential to achieve rehab goals is good . Patient would benefit from skilled Occupational Therapy services to address deficits and promote functional gains and return to timely completion of self care demands and IADL's.       GOALS:   Patient to demonstrate, with involved extremity    -good skill with progressive edema reduction technique facilitating gains with motion and function.   -forearm AROM MANLEY 120 degrees, to carry and place dishware as desired and/or steer vehicle.   -wrist AROM MANLEY 120 degrees, to tolerate four point position during cleaning and shifting body position(s).  -finger AROM MANLEY 220 degrees, to maintain grasp on ADL objects during use.  -independence opening packages, key pinch 16#.  -independence opening jars,  strength 70#.  -good skill with progressive orthosis regimen, applying orthosis correctly and using according to medically necessary wear schedule as prescribed by therapist.    Patient verbalized understanding and is in agreement with Occupational Therapy Goals and the plan of care.     Problems To Be Addressed: Knowledge of precautions, knowledge of HEP, pain and  edema management, ROM/joint mobility, coordination, motor skills, strength recovery, endurance recovery, orthosis use, wear/care, precautions.    Planned Interventions include: wound care as needed, patient education/instruction, home program instruction and review, edema control, manual therapy, motor coordination, therapeutic exercise, therapeutic activities, ADL and IADL retraining, neuromuscular re-education, dry needling, NMES, Fluidotherapy, ultrasound, Kinesiotaping, orthosis fabrication/fit training.

## 2024-01-12 ENCOUNTER — ANCILLARY PROCEDURE (OUTPATIENT)
Dept: RADIOLOGY | Facility: CLINIC | Age: 34
End: 2024-01-12
Payer: MEDICARE

## 2024-01-12 ENCOUNTER — OFFICE VISIT (OUTPATIENT)
Dept: ORTHOPEDIC SURGERY | Facility: CLINIC | Age: 34
End: 2024-01-12
Payer: MEDICARE

## 2024-01-12 DIAGNOSIS — S52.509A FRACTURE OF DISTAL END OF RADIUS WITHOUT ADDITIONAL FRACTURE: ICD-10-CM

## 2024-01-12 PROCEDURE — 73110 X-RAY EXAM OF WRIST: CPT | Mod: RIGHT SIDE | Performed by: STUDENT IN AN ORGANIZED HEALTH CARE EDUCATION/TRAINING PROGRAM

## 2024-01-12 PROCEDURE — 99024 POSTOP FOLLOW-UP VISIT: CPT | Performed by: STUDENT IN AN ORGANIZED HEALTH CARE EDUCATION/TRAINING PROGRAM

## 2024-01-12 PROCEDURE — 73110 X-RAY EXAM OF WRIST: CPT | Mod: RT

## 2024-01-12 NOTE — PROGRESS NOTES
S/p right distal radius ORIF 11/24/23. Doing well. Denies numbness or tingling.    Has been progressing with physical therapy.  He is working on weightbearing at this point.  He does work in a heavy lifting job    Physical Examination:  The patient appears to be their stated age, is in no apparent distress, and is oriented x3. The patients mood and affect are appropriate. The patients gait is normal. The examination of the limb in question was performed in comparison to the contralateral limb.    Wrist extension and 60 flexion 60  AIN, PIN, ulnar intact  SILT A/R/U/M  Hand wwp    Radiographs  Demonstrate a right radial styloid status post reduction and screw placement without evidence of.  Hardware lucencies or fracture migration.    Assessment:  7  weeks post op    Plan:   Bracing removed today.  Continue working with therapy to progress weightbearing.  I would like to see him back in 1 month.  At that point we will hopefully release him back to work.    Jodie Parsons MD

## 2024-01-12 NOTE — LETTER
January 12, 2024     Patient: Kulwinder Story Jr.   YOB: 1990   Date of Visit: 1/12/2024       To Whom It May Concern:    It is my medical opinion that Kulwinder Story should remain out of work until his next follow up visit in 1 month.     If you have any questions or concerns, please don't hesitate to call.         Sincerely,        Jodie Ramirez MD    CC: No Recipients

## 2024-01-24 ENCOUNTER — TREATMENT (OUTPATIENT)
Dept: OCCUPATIONAL THERAPY | Facility: CLINIC | Age: 34
End: 2024-01-24
Payer: MEDICARE

## 2024-01-24 DIAGNOSIS — S62.101D WRIST FRACTURE, CLOSED, RIGHT, WITH ROUTINE HEALING, SUBSEQUENT ENCOUNTER: Primary | ICD-10-CM

## 2024-01-24 PROCEDURE — 97140 MANUAL THERAPY 1/> REGIONS: CPT | Mod: GO | Performed by: OCCUPATIONAL THERAPIST

## 2024-01-24 PROCEDURE — 97110 THERAPEUTIC EXERCISES: CPT | Mod: GO | Performed by: OCCUPATIONAL THERAPIST

## 2024-01-24 ASSESSMENT — PAIN SCALES - GENERAL: PAINLEVEL_OUTOF10: 0 - NO PAIN

## 2024-01-24 ASSESSMENT — PAIN - FUNCTIONAL ASSESSMENT: PAIN_FUNCTIONAL_ASSESSMENT: 0-10

## 2024-01-24 ASSESSMENT — PAIN DESCRIPTION - DESCRIPTORS: DESCRIPTORS: SHARP

## 2024-01-24 NOTE — PROGRESS NOTES
Occupational Therapy   Upper Extremity Progress Note    Patient Name: Kulwinder Story Jr.  MRN: [id number]  Referring  Physician: Dr. Jodie Ramirez  Time Calculation Time Calculation  Start Time: 0847  Stop Time: 0930  Time Calculation (min): 43 min       [place] OPEN RDL SHAFT FX CLOSED RAD/ULN JT DISLOCATE   RIGHT WRIST OPEN REDUCTION INTERNAL FIXATION RIGHT DISTAL RADIUS   FRACTURE, SUPINE, SYNTHES EQUIPMENT- CANNULATED SCREWS     OP Note: A 2 cm incision was made over the radial styloid. Care was taken to ensure the radial sensory nerve was not in the field. A reduction was performed with manual traction and ulnar deviation. Under fluoroscopy a reduction was verified. A 1.6 K wire was placed from the radial styloid crossing the fracture into the proximal radial metaphysis. An additional K wire was then placed. Both of these were measured and found to be 32 and 36mm , respectively . after this headless compression screws from the [employer] set were open and the first K wire was overdrilled. The 36 mm screw was placed under fluoroscopy. Appropriate compression across the fracture site was visualized. The second screw was then overdrilled and a 32 mm screw placed across the fracture site.     Date of Injury:  11/18/23  Date of Surgery: 11/24/23    Precautions: WBAT  Allergies:       None Reported          Insurance    Visit number: 3/8, discharge today.   Approved number of visits: BOA  Authorization required prior to treatment: Yes  Insurance Company: [employer]// BOA COPAY 0 DED 0 COVERAGE 100  OOP 0 AUTH REQ AFTER IE      Subjective  Patient reports when he puts pressure into palm he has significant discomfort along dorsal carpus. Patient reports difficulty opening containers.   Patient would like to functionally improve ability to use hand tools at work, do push ups, and wash his back.     Pain  Pain Assessment  Pain Assessment: 0-10  Pain Score: 0 - No pain  Pain Descriptors: Sharp        Objective  Hand  Dominance: Right    Outcome Measures:   DASH Score: 36.36     Skin/ Wound/Scar: Flat scar, non-adherent    Sensation: WFL    Dexterity/ Coordination: To be evaluated as needed. Presently patient reports good skill with ADL fasteners.       AROM  Right Left   Forearm  Pronation/Supination 80/60 75/90      -   Wrist Extension/Flexion 55/52 70/70    Radial Deviation/Ulnar Deviation 15/30 20/40         Hand ROM  AROM   THUMB      Kapandji 10/10     Hand AROM WNL  Strength deferred  Gross Grasp (lbs)  Right Left   2nd setting 53, sharp DRUJ 102       Pinch Strength Right Left   Lateral 20 14   Tripod - -   Tip  - -       Edema (cm):    Right Left   WRIST DWC 17.8 16.6       Treatment Completed: 43 minutes    Paraffin 80841 time minutes, 0    Therapeutic exercise (23894): timed minutes 33  -measured ROM, edema, and strength  -AROM forearm, wrist, hand including tendon glides  -prayer stretch  -zig zag stretch 10 reps, 5 count hold  -Wave web grasp/release P/S 50 reps each  -AROM fluido emphasis P/S and wrist E/F  -WB edge of table after Jt mob and IASTM  -hammer pronation with end range hold  -wrist PREs 4#, 20 reps each      Manual Therapy (02391): timed minutes 10  Kinesiotape 1st ext compartment  Pull and hold STM first ext compartment    HEP and Patient Education:       -WB level 3 putty edge of table, gentle  Kinesiotape 1st ext compartment  Pull and hold STM first ext compartment       -IASTM dorsal carpus, issued guasha tool      -prayer stretch  -zig zag stretch 10 reps, 5 count hold  -AROM forearm, wrist. digits 15-20 reps each, 2 to 4x/day  -edema management daily  -scar and STM daily  -Educated patient to diagnosis and rehab expectations including frequency and duration of services.     Assessment: Discomfort along dorsal carpus especially R-C and first ext compartment during loaded as well as unloaded wrist extension. Small gains with AROM. Forearm P/S nearing WNL.     Evaluation Data: Patient is a 34 yo RHDM   s/p Right radius and ulna fracture from a fall inside the home and he fell asleep on limb. Noted DRUJ dislocation on imaging data resulting in limited participation in pain-free ADLs and inability to perform at their prior level of function. Skilled Occupational Therapy services are warranted to address the impairments found & listed previously in the objective section in order to return to safe and pain-free ADLs and prior level of function and to to realize measurable change in the outcome measures and achieve improvements in patient’s functional status and individual goals.      Patient resides independently with family. Patient enjoys hobbies including time with family.   Patient would like to gain recovery of their RIGHT hand function to improve their level of independence with ADLs and IADLs, specifically using work tools and returning to work as a .     Plan      Follow Up with Referring Physician: 1/5/23  Monitor home program.    Patient instructed to call if problems.      Frequency and duration: 1 time(s) a week, for 8 weeks, for 8 visits .   Comprehensive reassessments will be completed intermittently.   Potential to achieve rehab goals is good . Patient would benefit from skilled Occupational Therapy services to address deficits and promote functional gains and return to timely completion of self care demands and IADL's.       GOALS:   Patient to demonstrate, with involved extremity    -good skill with progressive edema reduction technique facilitating gains with motion and function.   -forearm AROM MANLEY 120 degrees, to carry and place dishware as desired and/or steer vehicle.   -wrist AROM MANLEY 120 degrees, to tolerate four point position during cleaning and shifting body position(s).  -finger AROM MANLEY 220 degrees, to maintain grasp on ADL objects during use.  -independence opening packages, key pinch 16#.  -independence opening jars,  strength 70#.  -good skill with progressive orthosis  regimen, applying orthosis correctly and using according to medically necessary wear schedule as prescribed by therapist.    Patient verbalized understanding and is in agreement with Occupational Therapy Goals and the plan of care.     Problems To Be Addressed: Knowledge of precautions, knowledge of HEP, pain and edema management, ROM/joint mobility, coordination, motor skills, strength recovery, endurance recovery, orthosis use, wear/care, precautions.    Planned Interventions include: wound care as needed, patient education/instruction, home program instruction and review, edema control, manual therapy, motor coordination, therapeutic exercise, therapeutic activities, ADL and IADL retraining, neuromuscular re-education, dry needling, NMES, Fluidotherapy, ultrasound, Kinesiotaping, orthosis fabrication/fit training.

## 2024-01-31 ENCOUNTER — TELEPHONE (OUTPATIENT)
Dept: ORTHOPEDIC SURGERY | Facility: CLINIC | Age: 34
End: 2024-01-31

## 2024-01-31 ENCOUNTER — TREATMENT (OUTPATIENT)
Dept: OCCUPATIONAL THERAPY | Facility: CLINIC | Age: 34
End: 2024-01-31
Payer: MEDICARE

## 2024-01-31 DIAGNOSIS — S62.101D WRIST FRACTURE, CLOSED, RIGHT, WITH ROUTINE HEALING, SUBSEQUENT ENCOUNTER: Primary | ICD-10-CM

## 2024-01-31 PROCEDURE — 97140 MANUAL THERAPY 1/> REGIONS: CPT | Mod: GO | Performed by: OCCUPATIONAL THERAPIST

## 2024-01-31 PROCEDURE — 97110 THERAPEUTIC EXERCISES: CPT | Mod: GO | Performed by: OCCUPATIONAL THERAPIST

## 2024-01-31 ASSESSMENT — PAIN DESCRIPTION - DESCRIPTORS: DESCRIPTORS: DISCOMFORT

## 2024-01-31 ASSESSMENT — PAIN - FUNCTIONAL ASSESSMENT: PAIN_FUNCTIONAL_ASSESSMENT: 0-10

## 2024-01-31 ASSESSMENT — PAIN SCALES - GENERAL: PAINLEVEL_OUTOF10: 5 - MODERATE PAIN

## 2024-01-31 NOTE — PROGRESS NOTES
Occupational Therapy   Upper Extremity Progress Note    Patient Name: Kulwinder Story Jr.  MRN: [id number]  Referring  Physician: Dr. Jodie Ramirez  Time Calculation Time Calculation  Start Time: 1150  Stop Time: 1230  Time Calculation (min): 40 min       [place] OPEN RDL SHAFT FX CLOSED RAD/ULN JT DISLOCATE   RIGHT WRIST OPEN REDUCTION INTERNAL FIXATION RIGHT DISTAL RADIUS   FRACTURE, SUPINE, SYNTHES EQUIPMENT- CANNULATED SCREWS     OP Note: A 2 cm incision was made over the radial styloid. Care was taken to ensure the radial sensory nerve was not in the field. A reduction was performed with manual traction and ulnar deviation. Under fluoroscopy a reduction was verified. A 1.6 K wire was placed from the radial styloid crossing the fracture into the proximal radial metaphysis. An additional K wire was then placed. Both of these were measured and found to be 32 and 36mm , respectively . after this headless compression screws from the [employer] set were open and the first K wire was overdrilled. The 36 mm screw was placed under fluoroscopy. Appropriate compression across the fracture site was visualized. The second screw was then overdrilled and a 32 mm screw placed across the fracture site.     Date of Injury:  11/18/23  Date of Surgery: 11/24/23    Precautions: WBAT  Allergies:       None Reported          Insurance    Visit number: 4/8  Approved number of visits: BOA  Authorization required prior to treatment: Yes  Insurance Company: [employer]// BOA COPAY 0 DED 0 COVERAGE 100  OOP 0 AUTH REQ AFTER IE      Subjective  Patient reports when he puts pressure into palm he has made improvement with decrease in pain and increase in motion along dorsal carpus. Patient reports difficulty opening containers.   Patient would like to functionally improve ability to use hand tools at work, do push ups, and wash his back.     Pain  Pain Assessment  Pain Assessment: 0-10  Pain Score: 5 - Moderate pain  Pain Descriptors:  "Discomfort        Objective  Hand Dominance: Right    Outcome Measures:   DASH Score: 36.36     Skin/ Wound/Scar: Flat scar, non-adherent    Sensation: WFL    Dexterity/ Coordination: To be evaluated as needed. Presently patient reports good skill with ADL fasteners.       AROM  Right Left   Forearm  Pronation/Supination 80/60 75/90      -   Wrist Extension/Flexion 55/52 70/70    Radial Deviation/Ulnar Deviation 15/30 20/40         Hand ROM  AROM   THUMB      Kapandji 10/10     Hand AROM WNL  Strength deferred  Gross Grasp (lbs)  Right Left   2nd setting 53, sharp DRUJ 102       Pinch Strength Right Left   Lateral 20 14   Tripod - -   Tip  - -       Edema (cm):    Right Left   WRIST DWC 17.8 16.6       Treatment Completed: 40 minutes    Paraffin 36484 time minutes, 0    Therapeutic exercise (07070): timed minutes 25  -walking plank on wall, 4 reps, 10 feet  -AROM forearm, wrist, hand including tendon glides with fluidotherapy  -Supine Wave web grasp and 2# 6\" med ball, anterior ball bounce plyometrics for prop training  -rebounder 2#, b and single and throw/catch  -AROM fluido emphasis P/S and wrist E/F, RD/UD  Reviewed:  -hammer pronation with end range hold  -wrist PREs 4#, 20 reps each  -prayer stretch  -zig zag stretch 10 reps, 5 count hold    Manual Therapy (58069): timed minutes 15  Pull and hold STM first ext compartment    HEP and Patient Education:      -walking plank on wall, 4 reps, 10 feet       -WB level 3 putty edge of table, gentle  Kinesiotape 1st ext compartment  Pull and hold STM first ext compartment       -IASTM dorsal carpus, issued guasha tool      -prayer stretch       -zig zag stretch 10 reps, 5 count hold  -AROM forearm, wrist. digits 15-20 reps each, 2 to 4x/day  -scar and STM daily    Assessment: Discomfort along dorsal carpus especially R-C and first ext compartment during loaded significantly improved.   Improved tolerance to plymometric. Fair skill with prop challenge.      Evaluation " Data: Patient is a 32 yo RHDM  s/p Right radius and ulna fracture from a fall inside the home and he fell asleep on limb. Noted DRUJ dislocation on imaging data resulting in limited participation in pain-free ADLs and inability to perform at their prior level of function. Skilled Occupational Therapy services are warranted to address the impairments found & listed previously in the objective section in order to return to safe and pain-free ADLs and prior level of function and to to realize measurable change in the outcome measures and achieve improvements in patient’s functional status and individual goals.      Patient resides independently with family. Patient enjoys hobbies including time with family.   Patient would like to gain recovery of their RIGHT hand function to improve their level of independence with ADLs and IADLs, specifically using work tools and returning to work as a .     Plan      Follow Up with Referring Physician: 1/5/23  Monitor home program.    Patient instructed to call if problems.      Frequency and duration: 1 time(s) a week, for 8 weeks, for 8 visits .   Comprehensive reassessments will be completed intermittently.   Potential to achieve rehab goals is good . Patient would benefit from skilled Occupational Therapy services to address deficits and promote functional gains and return to timely completion of self care demands and IADL's.       GOALS:   Patient to demonstrate, with involved extremity    -good skill with progressive edema reduction technique facilitating gains with motion and function.   -forearm AROM MANLEY 120 degrees, to carry and place dishware as desired and/or steer vehicle.   -wrist AROM MANLEY 120 degrees, to tolerate four point position during cleaning and shifting body position(s).  -finger AROM MANLEY 220 degrees, to maintain grasp on ADL objects during use.  -independence opening packages, key pinch 16#.  -independence opening jars,  strength 70#.  -good skill  with progressive orthosis regimen, applying orthosis correctly and using according to medically necessary wear schedule as prescribed by therapist.    Patient verbalized understanding and is in agreement with Occupational Therapy Goals and the plan of care.     Problems To Be Addressed: Knowledge of precautions, knowledge of HEP, pain and edema management, ROM/joint mobility, coordination, motor skills, strength recovery, endurance recovery, orthosis use, wear/care, precautions.    Planned Interventions include: wound care as needed, patient education/instruction, home program instruction and review, edema control, manual therapy, motor coordination, therapeutic exercise, therapeutic activities, ADL and IADL retraining, neuromuscular re-education, dry needling, NMES, Fluidotherapy, ultrasound, Kinesiotaping, orthosis fabrication/fit training.

## 2024-01-31 NOTE — TELEPHONE ENCOUNTER
Patient came into office    Asking if he can get something that states his diagnosis and plan of care    I did print out the office note from visit 1/12/23    Please call patient and advise if something else can be given

## 2024-02-23 ENCOUNTER — APPOINTMENT (OUTPATIENT)
Dept: ORTHOPEDIC SURGERY | Facility: CLINIC | Age: 34
End: 2024-02-23
Payer: MEDICARE

## 2024-03-04 ENCOUNTER — HOSPITAL ENCOUNTER (OUTPATIENT)
Dept: RADIOLOGY | Facility: CLINIC | Age: 34
Discharge: HOME | End: 2024-03-04
Payer: MEDICARE

## 2024-03-04 ENCOUNTER — OFFICE VISIT (OUTPATIENT)
Dept: ORTHOPEDIC SURGERY | Facility: CLINIC | Age: 34
End: 2024-03-04
Payer: MEDICARE

## 2024-03-04 DIAGNOSIS — S52.509A FRACTURE OF DISTAL END OF RADIUS WITHOUT ADDITIONAL FRACTURE: ICD-10-CM

## 2024-03-04 PROCEDURE — 99213 OFFICE O/P EST LOW 20 MIN: CPT | Performed by: STUDENT IN AN ORGANIZED HEALTH CARE EDUCATION/TRAINING PROGRAM

## 2024-03-04 PROCEDURE — 73100 X-RAY EXAM OF WRIST: CPT | Mod: RT

## 2024-03-04 PROCEDURE — 73100 X-RAY EXAM OF WRIST: CPT | Mod: RIGHT SIDE | Performed by: STUDENT IN AN ORGANIZED HEALTH CARE EDUCATION/TRAINING PROGRAM

## 2024-03-04 NOTE — PROGRESS NOTES
S/p right distal radius ORIF 11/24/23. Doing well. Denies numbness or tingling.    Is back to weight lifting.  Only thing he is unable to do currently is a push-up.    Physical Examination:  The patient appears to be their stated age, is in no apparent distress, and is oriented x3. The patients mood and affect are appropriate. The patients gait is normal. The examination of the limb in question was performed in comparison to the contralateral limb.    Wrist motion 8080  AIN, PIN, ulnar intact  SILT A/R/U/M  Hand wwp    Radiographs  Demonstrate a right radial styloid status post reduction and screw placement without evidence of.  Hardware lucencies or fracture migration.    Assessment:  14  weeks post op    Plan:   Return to work note provided today.  No restrictions.  Follow-up in 3 months for clinical exam.    Jodie Parsons MD

## 2024-03-04 NOTE — LETTER
March 4, 2024     Patient: Kulwinder Story Jr.   YOB: 1990   Date of Visit: 3/4/2024       To Whom It May Concern:    It is my medical opinion that Kulwinder Story may return to full duty immediately with no restrictions.    If you have any questions or concerns, please don't hesitate to call.         Sincerely,        Jodie Ramirez MD

## 2024-06-10 ENCOUNTER — APPOINTMENT (OUTPATIENT)
Dept: ORTHOPEDIC SURGERY | Facility: CLINIC | Age: 34
End: 2024-06-10
Payer: MEDICARE

## 2024-06-14 ENCOUNTER — APPOINTMENT (OUTPATIENT)
Dept: ORTHOPEDIC SURGERY | Facility: CLINIC | Age: 34
End: 2024-06-14
Payer: MEDICARE

## 2024-07-08 ENCOUNTER — APPOINTMENT (OUTPATIENT)
Dept: ORTHOPEDIC SURGERY | Facility: CLINIC | Age: 34
End: 2024-07-08
Payer: MEDICARE

## 2024-07-09 ENCOUNTER — HOSPITAL ENCOUNTER (EMERGENCY)
Facility: HOSPITAL | Age: 34
Discharge: HOME | End: 2024-07-09
Payer: MEDICARE

## 2024-07-09 VITALS
RESPIRATION RATE: 20 BRPM | HEART RATE: 85 BPM | BODY MASS INDEX: 30.48 KG/M2 | DIASTOLIC BLOOD PRESSURE: 90 MMHG | HEIGHT: 72 IN | TEMPERATURE: 98.2 F | WEIGHT: 225 LBS | SYSTOLIC BLOOD PRESSURE: 152 MMHG | OXYGEN SATURATION: 96 %

## 2024-07-09 DIAGNOSIS — H10.31 ACUTE CONJUNCTIVITIS OF RIGHT EYE, UNSPECIFIED ACUTE CONJUNCTIVITIS TYPE: Primary | ICD-10-CM

## 2024-07-09 PROCEDURE — 99283 EMERGENCY DEPT VISIT LOW MDM: CPT

## 2024-07-09 RX ORDER — BACITRACIN ZINC AND POLYMYXIN B SULFATE 500; 10000 [USP'U]/G; [USP'U]/G
OINTMENT OPHTHALMIC 3 TIMES DAILY
Qty: 3.5 G | Refills: 0 | Status: SHIPPED | OUTPATIENT
Start: 2024-07-09 | End: 2024-07-14

## 2024-07-09 RX ORDER — BACITRACIN ZINC AND POLYMYXIN B SULFATE 500; 10000 [USP'U]/G; [USP'U]/G
OINTMENT OPHTHALMIC 3 TIMES DAILY
Qty: 3.5 G | Refills: 0 | Status: SHIPPED | OUTPATIENT
Start: 2024-07-09 | End: 2024-07-09

## 2024-07-09 ASSESSMENT — PAIN SCALES - GENERAL: PAINLEVEL_OUTOF10: 7

## 2024-07-09 ASSESSMENT — COLUMBIA-SUICIDE SEVERITY RATING SCALE - C-SSRS
2. HAVE YOU ACTUALLY HAD ANY THOUGHTS OF KILLING YOURSELF?: NO
1. IN THE PAST MONTH, HAVE YOU WISHED YOU WERE DEAD OR WISHED YOU COULD GO TO SLEEP AND NOT WAKE UP?: NO
6. HAVE YOU EVER DONE ANYTHING, STARTED TO DO ANYTHING, OR PREPARED TO DO ANYTHING TO END YOUR LIFE?: NO

## 2024-07-09 ASSESSMENT — PAIN DESCRIPTION - FREQUENCY: FREQUENCY: CONSTANT/CONTINUOUS

## 2024-07-09 ASSESSMENT — LIFESTYLE VARIABLES
HAVE YOU EVER FELT YOU SHOULD CUT DOWN ON YOUR DRINKING: NO
EVER FELT BAD OR GUILTY ABOUT YOUR DRINKING: NO
HAVE PEOPLE ANNOYED YOU BY CRITICIZING YOUR DRINKING: NO
EVER HAD A DRINK FIRST THING IN THE MORNING TO STEADY YOUR NERVES TO GET RID OF A HANGOVER: NO
TOTAL SCORE: 0

## 2024-07-09 ASSESSMENT — PAIN DESCRIPTION - LOCATION: LOCATION: EYE

## 2024-07-09 ASSESSMENT — PAIN DESCRIPTION - PROGRESSION: CLINICAL_PROGRESSION: NOT CHANGED

## 2024-07-09 ASSESSMENT — PAIN DESCRIPTION - DESCRIPTORS: DESCRIPTORS: BURNING

## 2024-07-09 ASSESSMENT — PAIN - FUNCTIONAL ASSESSMENT: PAIN_FUNCTIONAL_ASSESSMENT: 0-10

## 2024-07-09 ASSESSMENT — PAIN DESCRIPTION - ORIENTATION: ORIENTATION: RIGHT

## 2024-07-09 ASSESSMENT — PAIN DESCRIPTION - PAIN TYPE: TYPE: ACUTE PAIN

## 2024-07-09 ASSESSMENT — PAIN DESCRIPTION - ONSET: ONSET: AWAKENED FROM SLEEP

## 2024-07-09 NOTE — ED PROVIDER NOTES
HPI   Chief Complaint   Patient presents with    Eye Pain     Right eye red and irritated.         34-year-old male presents emergency department, patient states for the last day or so he has had irritation, tearing and redness to the right eye.  Describes light sensitivity as well.  Patient states this happened previously, was prescribed an ointment and his symptoms much improved.  States it does feel quite irritated, possible foreign body sensation, although no obvious foreign body that he knows of.  No vision changes.  States throbbing-like discomfort.      History provided by:  Patient   used: No                        Juanis Coma Scale Score: 15                     Patient History   History reviewed. No pertinent past medical history.  History reviewed. No pertinent surgical history.  No family history on file.  Social History     Tobacco Use    Smoking status: Every Day     Current packs/day: 0.50     Types: Cigarettes    Smokeless tobacco: Never   Vaping Use    Vaping status: Never Used   Substance Use Topics    Alcohol use: Defer    Drug use: Never       Physical Exam   ED Triage Vitals [07/09/24 1448]   Temperature Heart Rate Respirations BP   36.8 °C (98.2 °F) 85 20 152/90      Pulse Ox Temp Source Heart Rate Source Patient Position   96 % Temporal Monitor Sitting      BP Location FiO2 (%)     Right arm --       Physical Exam  Eye Injury:   Gen.: Vitals noted no distress afebrile. Normal phonation, no stridor, no trismus.   Optho: Exam of the left eye is unremarkable. Exam of left right eye shows conjunctival injection. There are no obvious foreign bodies. The lids were everted without foreign bodies as well. Tetracaine and fluorescein were instilled with resolution of the patient's pain. Negative Annabella sign. There is no direct or consensual photophobia. No pre-or post-septal cellulitis. .   Cardiac: Regular rate rhythm no murmur.   Extremities: No peripheral edema, negative Homans  bilaterally no cords.   Skin: No rash.   Neuro: No focal neurologic deficits. NIH score is 0.    ED Course & MDM   Diagnoses as of 07/09/24 1537   Acute conjunctivitis of right eye, unspecified acute conjunctivitis type       Medical Decision Making  Discussed follow-up with ophthalmology, referral was provided, although patient does have an ophthalmologist that he sees for his glasses.    In the meantime we will initiate him on Polytrim ophthalmic ointment as this did help previously.  Discussed return with any worsening symptoms or any additional concerns.    Procedure  Procedures     Ofe Javed, BLANCA-CNP  07/09/24 154

## 2024-07-09 NOTE — Clinical Note
Kulwinder Story was seen and treated in our emergency department on 7/9/2024.  He may return to work on 07/12/2024.       If you have any questions or concerns, please don't hesitate to call.      Ofe Javed, BLANCA-CNP

## 2024-10-26 ENCOUNTER — HOSPITAL ENCOUNTER (EMERGENCY)
Age: 34
Discharge: HOME | End: 2024-10-26
Attending: STUDENT IN AN ORGANIZED HEALTH CARE EDUCATION/TRAINING PROGRAM

## 2024-10-26 ENCOUNTER — APPOINTMENT (OUTPATIENT)
Dept: CT IMAGING | Age: 34
End: 2024-10-26

## 2024-10-26 ENCOUNTER — APPOINTMENT (OUTPATIENT)
Dept: GENERAL RADIOLOGY | Age: 34
End: 2024-10-26

## 2024-10-26 VITALS
DIASTOLIC BLOOD PRESSURE: 92 MMHG | HEIGHT: 72 IN | WEIGHT: 220 LBS | RESPIRATION RATE: 16 BRPM | HEART RATE: 98 BPM | BODY MASS INDEX: 29.8 KG/M2 | OXYGEN SATURATION: 99 % | SYSTOLIC BLOOD PRESSURE: 127 MMHG | TEMPERATURE: 98.4 F

## 2024-10-26 DIAGNOSIS — S92.001A CLOSED DISPLACED FRACTURE OF RIGHT CALCANEUS, UNSPECIFIED PORTION OF CALCANEUS, INITIAL ENCOUNTER: Primary | ICD-10-CM

## 2024-10-26 LAB
ANION GAP SERPL CALCULATED.3IONS-SCNC: 14 MEQ/L (ref 9–15)
APTT PPP: 26.9 SEC (ref 24.4–36.8)
BASOPHILS # BLD: 0 K/UL (ref 0–0.2)
BASOPHILS NFR BLD: 0.2 %
BUN SERPL-MCNC: 15 MG/DL (ref 6–20)
CALCIUM SERPL-MCNC: 9.1 MG/DL (ref 8.5–9.9)
CHLORIDE SERPL-SCNC: 101 MEQ/L (ref 95–107)
CK SERPL-CCNC: 599 U/L (ref 0–190)
CO2 SERPL-SCNC: 23 MEQ/L (ref 20–31)
CREAT SERPL-MCNC: 1.06 MG/DL (ref 0.7–1.2)
EOSINOPHIL # BLD: 0 K/UL (ref 0–0.7)
EOSINOPHIL NFR BLD: 0.3 %
ERYTHROCYTE [DISTWIDTH] IN BLOOD BY AUTOMATED COUNT: 13.1 % (ref 11.5–14.5)
GLUCOSE SERPL-MCNC: 105 MG/DL (ref 70–99)
HCT VFR BLD AUTO: 43.3 % (ref 42–52)
HGB BLD-MCNC: 15.5 G/DL (ref 14–18)
INR PPP: 1
LACTATE BLDV-SCNC: 2.8 MMOL/L (ref 0.5–2.2)
LYMPHOCYTES # BLD: 2 K/UL (ref 1–4.8)
LYMPHOCYTES NFR BLD: 17 %
MCH RBC QN AUTO: 31.1 PG (ref 27–31.3)
MCHC RBC AUTO-ENTMCNC: 35.8 % (ref 33–37)
MCV RBC AUTO: 86.9 FL (ref 79–92.2)
MONOCYTES # BLD: 0.5 K/UL (ref 0.2–0.8)
MONOCYTES NFR BLD: 4.2 %
NEUTROPHILS # BLD: 9.2 K/UL (ref 1.4–6.5)
NEUTS SEG NFR BLD: 78 %
PLATELET # BLD AUTO: 311 K/UL (ref 130–400)
POTASSIUM SERPL-SCNC: 3.9 MEQ/L (ref 3.4–4.9)
PROTHROMBIN TIME: 13.8 SEC (ref 12.3–14.9)
RBC # BLD AUTO: 4.98 M/UL (ref 4.7–6.1)
SODIUM SERPL-SCNC: 138 MEQ/L (ref 135–144)
WBC # BLD AUTO: 11.8 K/UL (ref 4.8–10.8)

## 2024-10-26 PROCEDURE — 73700 CT LOWER EXTREMITY W/O DYE: CPT

## 2024-10-26 PROCEDURE — 70450 CT HEAD/BRAIN W/O DYE: CPT

## 2024-10-26 PROCEDURE — 85730 THROMBOPLASTIN TIME PARTIAL: CPT

## 2024-10-26 PROCEDURE — 83605 ASSAY OF LACTIC ACID: CPT

## 2024-10-26 PROCEDURE — 80048 BASIC METABOLIC PNL TOTAL CA: CPT

## 2024-10-26 PROCEDURE — 73630 X-RAY EXAM OF FOOT: CPT

## 2024-10-26 PROCEDURE — 99284 EMERGENCY DEPT VISIT MOD MDM: CPT

## 2024-10-26 PROCEDURE — 72125 CT NECK SPINE W/O DYE: CPT

## 2024-10-26 PROCEDURE — 85610 PROTHROMBIN TIME: CPT

## 2024-10-26 PROCEDURE — 85025 COMPLETE CBC W/AUTO DIFF WBC: CPT

## 2024-10-26 PROCEDURE — 73610 X-RAY EXAM OF ANKLE: CPT

## 2024-10-26 PROCEDURE — 82550 ASSAY OF CK (CPK): CPT

## 2024-10-26 RX ORDER — OXYCODONE AND ACETAMINOPHEN 5; 325 MG/1; MG/1
1 TABLET ORAL EVERY 6 HOURS PRN
Qty: 12 TABLET | Refills: 0 | Status: SHIPPED | OUTPATIENT
Start: 2024-10-26 | End: 2024-10-29

## 2024-10-26 ASSESSMENT — PAIN SCALES - GENERAL
PAINLEVEL_OUTOF10: 7
PAINLEVEL_OUTOF10: 9

## 2024-10-26 ASSESSMENT — PAIN DESCRIPTION - DESCRIPTORS: DESCRIPTORS: ACHING

## 2024-10-26 ASSESSMENT — PAIN DESCRIPTION - PAIN TYPE: TYPE: ACUTE PAIN

## 2024-10-26 ASSESSMENT — PAIN - FUNCTIONAL ASSESSMENT
PAIN_FUNCTIONAL_ASSESSMENT: 0-10
PAIN_FUNCTIONAL_ASSESSMENT: 0-10

## 2024-10-26 ASSESSMENT — PAIN DESCRIPTION - FREQUENCY: FREQUENCY: CONTINUOUS

## 2024-10-26 ASSESSMENT — PAIN DESCRIPTION - LOCATION: LOCATION: ANKLE

## 2024-10-26 ASSESSMENT — PAIN DESCRIPTION - ORIENTATION: ORIENTATION: RIGHT

## 2024-10-26 NOTE — ED TRIAGE NOTES
Pt from home via EMS for trip and fall down at least one or more steps. ETOH on board. C/O right ankle and head pain. A&Ox4

## 2024-10-26 NOTE — ED PROVIDER NOTES
[facility] ED  EMERGENCY DEPARTMENT ENCOUNTER      Pt Name: Mike Carbajal  MRN: [id number]  Birthdate 1990  Date of evaluation: 10/26/2024  Provider: Jerson Cisneros PA-C  4:57 AM EDT    CHIEF COMPLAINT       Chief Complaint   Patient presents with    Fall     Missed step/steps         HISTORY OF PRESENT ILLNESS   (Location/Symptom, Timing/Onset, Context/Setting, Quality, Duration, Modifying Factors, Severity)  Note limiting factors.   Mike Carbajal is a 34 y.o. male whom per chart review has no PMHx presents to ED via EMS for evaluation following a fall.  Patient states that he fell down several steps PTA, stating that he slipped.  Patient denies LOC, states that he is uncertain of head strike.  However, patient does arrive to the ED complaining of a headache and is noted with an abrasion to the L side of his scalp.  Additionally, patient has complaints of pain to his R foot, R ankle.  Patient verbalizes no additional complaints.  Patient denies chest pain, shortness of breath, N/V/D, fever, chills.    Patient reports that he did consume alcohol PTA.    HPI    Nursing Notes were reviewed.    REVIEW OF SYSTEMS    (2-9 systems for level 4, 10 or more for level 5)     Review of Systems   Musculoskeletal:  Positive for arthralgias (R foot, R ankle).   Neurological:  Positive for headaches.   All other systems reviewed and are negative.      Except as noted above the remainder of the review of systems was reviewed and negative.       PAST MEDICAL HISTORY   History reviewed. No pertinent past medical history.      SURGICAL HISTORY       Past Surgical History:   Procedure Laterality Date    FOREARM SURGERY Right 11/24/2023    RIGHT WRIST OPEN REDUCTION INTERNAL FIXATION RIGHT DISTAL RADIUS FRACTURE, performed by Monica Dudley MD at [facility] OR         CURRENT MEDICATIONS       Previous Medications    NAPROXEN (NAPROSYN) 500 MG TABLET    Take 1 tablet by mouth 2 times daily (with meals)

## 2024-10-26 NOTE — ED NOTES
A custom posterior short leg splint was applied to the pt's right lower leg and then a sugar tong splint.  Pt tolerated the procedure well.

## 2024-10-27 RX ORDER — ONDANSETRON 4 MG/1
4 TABLET, FILM COATED ORAL 3 TIMES DAILY PRN
Qty: 21 TABLET | Refills: 0 | Status: SHIPPED | OUTPATIENT
Start: 2024-10-27 | End: 2024-11-17

## 2024-10-27 RX ORDER — NAPROXEN 500 MG/1
500 TABLET ORAL 2 TIMES DAILY WITH MEALS
Qty: 14 TABLET | Refills: 0 | Status: SHIPPED | OUTPATIENT
Start: 2024-10-27 | End: 2024-11-03

## 2024-11-09 ENCOUNTER — ANESTHESIA EVENT (OUTPATIENT)
Dept: OPERATING ROOM | Age: 34
End: 2024-11-09
Payer: COMMERCIAL

## 2024-11-10 NOTE — H&P
PODIATRIC HISTORY AND PHYSICAL UPDATE                                            EVALUATION DATE: 11/11/24   EVALUATION TIME: 6:36 AM      The documented History and Physical (completed in the past 30 days) has been reviewed and the patient had a confirmatory musculoskeletal exam performed. The contents of the pre-operative assesment accurately reflect the patient's condition with the following additions or revisions since the H&P was completed:  No changes.    This H&P can be found in the record dated 10/26/24 from ED.    SIGNATURE: Kym Mcgrath DPM PATIENT NAME: Mike Carbajal   DATE: November 11, 2024 MRN: [id number]

## 2024-11-11 ENCOUNTER — HOSPITAL ENCOUNTER (OUTPATIENT)
Age: 34
Setting detail: OUTPATIENT SURGERY
Discharge: HOME | End: 2024-11-11
Attending: STUDENT IN AN ORGANIZED HEALTH CARE EDUCATION/TRAINING PROGRAM | Admitting: STUDENT IN AN ORGANIZED HEALTH CARE EDUCATION/TRAINING PROGRAM
Payer: COMMERCIAL

## 2024-11-11 ENCOUNTER — APPOINTMENT (OUTPATIENT)
Dept: OPHTHALMOLOGY | Facility: CLINIC | Age: 34
End: 2024-11-11
Payer: MEDICARE

## 2024-11-11 ENCOUNTER — APPOINTMENT (OUTPATIENT)
Dept: GENERAL RADIOLOGY | Age: 34
End: 2024-11-11
Attending: STUDENT IN AN ORGANIZED HEALTH CARE EDUCATION/TRAINING PROGRAM
Payer: COMMERCIAL

## 2024-11-11 ENCOUNTER — ANESTHESIA (OUTPATIENT)
Dept: OPERATING ROOM | Age: 34
End: 2024-11-11
Payer: COMMERCIAL

## 2024-11-11 VITALS
DIASTOLIC BLOOD PRESSURE: 77 MMHG | RESPIRATION RATE: 16 BRPM | WEIGHT: 225 LBS | TEMPERATURE: 97.1 F | OXYGEN SATURATION: 97 % | HEART RATE: 84 BPM | SYSTOLIC BLOOD PRESSURE: 134 MMHG | HEIGHT: 72 IN | BODY MASS INDEX: 30.48 KG/M2

## 2024-11-11 DIAGNOSIS — Z41.9 SURGERY, ELECTIVE: Primary | ICD-10-CM

## 2024-11-11 PROCEDURE — A4217 STERILE WATER/SALINE, 500 ML: HCPCS | Performed by: STUDENT IN AN ORGANIZED HEALTH CARE EDUCATION/TRAINING PROGRAM

## 2024-11-11 PROCEDURE — 3700000000 HC ANESTHESIA ATTENDED CARE: Performed by: STUDENT IN AN ORGANIZED HEALTH CARE EDUCATION/TRAINING PROGRAM

## 2024-11-11 PROCEDURE — 2580000003 HC RX 258: Performed by: STUDENT IN AN ORGANIZED HEALTH CARE EDUCATION/TRAINING PROGRAM

## 2024-11-11 PROCEDURE — 64445 NJX AA&/STRD SCIATIC NRV IMG: CPT | Performed by: ANESTHESIOLOGY

## 2024-11-11 PROCEDURE — 2500000003 HC RX 250 WO HCPCS: Performed by: NURSE ANESTHETIST, CERTIFIED REGISTERED

## 2024-11-11 PROCEDURE — 6360000002 HC RX W HCPCS: Performed by: NURSE ANESTHETIST, CERTIFIED REGISTERED

## 2024-11-11 PROCEDURE — 3600000014 HC SURGERY LEVEL 4 ADDTL 15MIN: Performed by: STUDENT IN AN ORGANIZED HEALTH CARE EDUCATION/TRAINING PROGRAM

## 2024-11-11 PROCEDURE — 2720000010 HC SURG SUPPLY STERILE: Performed by: STUDENT IN AN ORGANIZED HEALTH CARE EDUCATION/TRAINING PROGRAM

## 2024-11-11 PROCEDURE — 6360000002 HC RX W HCPCS: Performed by: STUDENT IN AN ORGANIZED HEALTH CARE EDUCATION/TRAINING PROGRAM

## 2024-11-11 PROCEDURE — 7100000000 HC PACU RECOVERY - FIRST 15 MIN: Performed by: STUDENT IN AN ORGANIZED HEALTH CARE EDUCATION/TRAINING PROGRAM

## 2024-11-11 PROCEDURE — 73620 X-RAY EXAM OF FOOT: CPT

## 2024-11-11 PROCEDURE — 3700000001 HC ADD 15 MINUTES (ANESTHESIA): Performed by: STUDENT IN AN ORGANIZED HEALTH CARE EDUCATION/TRAINING PROGRAM

## 2024-11-11 PROCEDURE — 6370000000 HC RX 637 (ALT 250 FOR IP)

## 2024-11-11 PROCEDURE — C1769 GUIDE WIRE: HCPCS | Performed by: STUDENT IN AN ORGANIZED HEALTH CARE EDUCATION/TRAINING PROGRAM

## 2024-11-11 PROCEDURE — 3600000004 HC SURGERY LEVEL 4 BASE: Performed by: STUDENT IN AN ORGANIZED HEALTH CARE EDUCATION/TRAINING PROGRAM

## 2024-11-11 PROCEDURE — 7100000010 HC PHASE II RECOVERY - FIRST 15 MIN: Performed by: STUDENT IN AN ORGANIZED HEALTH CARE EDUCATION/TRAINING PROGRAM

## 2024-11-11 PROCEDURE — 64447 NJX AA&/STRD FEMORAL NRV IMG: CPT | Performed by: ANESTHESIOLOGY

## 2024-11-11 PROCEDURE — 7100000001 HC PACU RECOVERY - ADDTL 15 MIN: Performed by: STUDENT IN AN ORGANIZED HEALTH CARE EDUCATION/TRAINING PROGRAM

## 2024-11-11 PROCEDURE — 2709999900 HC NON-CHARGEABLE SUPPLY: Performed by: STUDENT IN AN ORGANIZED HEALTH CARE EDUCATION/TRAINING PROGRAM

## 2024-11-11 PROCEDURE — C1713 ANCHOR/SCREW BN/BN,TIS/BN: HCPCS | Performed by: STUDENT IN AN ORGANIZED HEALTH CARE EDUCATION/TRAINING PROGRAM

## 2024-11-11 PROCEDURE — 7100000011 HC PHASE II RECOVERY - ADDTL 15 MIN: Performed by: STUDENT IN AN ORGANIZED HEALTH CARE EDUCATION/TRAINING PROGRAM

## 2024-11-11 PROCEDURE — 2580000003 HC RX 258: Performed by: ANESTHESIOLOGY

## 2024-11-11 PROCEDURE — 2580000003 HC RX 258: Performed by: NURSE ANESTHETIST, CERTIFIED REGISTERED

## 2024-11-11 PROCEDURE — 6360000002 HC RX W HCPCS: Performed by: ANESTHESIOLOGY

## 2024-11-11 PROCEDURE — 6370000000 HC RX 637 (ALT 250 FOR IP): Performed by: STUDENT IN AN ORGANIZED HEALTH CARE EDUCATION/TRAINING PROGRAM

## 2024-11-11 PROCEDURE — 73600 X-RAY EXAM OF ANKLE: CPT

## 2024-11-11 DEVICE — LOCKING SCREW
Type: IMPLANTABLE DEVICE | Site: FOOT | Status: FUNCTIONAL
Brand: VARIAX

## 2024-11-11 DEVICE — BONE SCREW
Type: IMPLANTABLE DEVICE | Site: FOOT | Status: FUNCTIONAL
Brand: VARIAX

## 2024-11-11 DEVICE — CANNULATED SCREW
Type: IMPLANTABLE DEVICE | Site: FOOT | Status: FUNCTIONAL
Brand: ASNIS

## 2024-11-11 DEVICE — BIOACTIVE BONE GRAFT SUBSTITUTE, FOAM PACK; BETA-TRICALCIUM PHOSPHATE, TYPE I BOVINE COLLAGEN, AND BIOACTIVE GLASS
Type: IMPLANTABLE DEVICE | Site: FOOT | Status: FUNCTIONAL
Brand: VITOSS BBTRAUMA

## 2024-11-11 DEVICE — CALCANEUS MESH PLATE, MEDIUM
Type: IMPLANTABLE DEVICE | Site: FOOT | Status: FUNCTIONAL
Brand: VARIAX

## 2024-11-11 RX ORDER — ROCURONIUM BROMIDE 10 MG/ML
INJECTION, SOLUTION INTRAVENOUS
Status: DISCONTINUED | OUTPATIENT
Start: 2024-11-11 | End: 2024-11-11 | Stop reason: SDUPTHER

## 2024-11-11 RX ORDER — ONDANSETRON 2 MG/ML
4 INJECTION INTRAMUSCULAR; INTRAVENOUS
Status: DISCONTINUED | OUTPATIENT
Start: 2024-11-11 | End: 2024-11-11 | Stop reason: HOSPADM

## 2024-11-11 RX ORDER — SODIUM CHLORIDE, SODIUM LACTATE, POTASSIUM CHLORIDE, CALCIUM CHLORIDE 600; 310; 30; 20 MG/100ML; MG/100ML; MG/100ML; MG/100ML
INJECTION, SOLUTION INTRAVENOUS CONTINUOUS
Status: DISCONTINUED | OUTPATIENT
Start: 2024-11-11 | End: 2024-11-11 | Stop reason: HOSPADM

## 2024-11-11 RX ORDER — ASPIRIN 325 MG
325 TABLET ORAL DAILY
Qty: 30 TABLET | Refills: 3 | Status: SHIPPED | OUTPATIENT
Start: 2024-11-11

## 2024-11-11 RX ORDER — OXYCODONE HYDROCHLORIDE 5 MG/1
5 TABLET ORAL
Status: DISCONTINUED | OUTPATIENT
Start: 2024-11-11 | End: 2024-11-11 | Stop reason: HOSPADM

## 2024-11-11 RX ORDER — PROPOFOL 10 MG/ML
INJECTION, EMULSION INTRAVENOUS
Status: DISCONTINUED | OUTPATIENT
Start: 2024-11-11 | End: 2024-11-11 | Stop reason: SDUPTHER

## 2024-11-11 RX ORDER — SODIUM CHLORIDE 0.9 % (FLUSH) 0.9 %
5-40 SYRINGE (ML) INJECTION PRN
Status: DISCONTINUED | OUTPATIENT
Start: 2024-11-11 | End: 2024-11-11 | Stop reason: HOSPADM

## 2024-11-11 RX ORDER — MEPERIDINE HYDROCHLORIDE 25 MG/ML
12.5 INJECTION INTRAMUSCULAR; INTRAVENOUS; SUBCUTANEOUS
Status: DISCONTINUED | OUTPATIENT
Start: 2024-11-11 | End: 2024-11-11 | Stop reason: HOSPADM

## 2024-11-11 RX ORDER — SODIUM CHLORIDE, SODIUM LACTATE, POTASSIUM CHLORIDE, CALCIUM CHLORIDE 600; 310; 30; 20 MG/100ML; MG/100ML; MG/100ML; MG/100ML
INJECTION, SOLUTION INTRAVENOUS
Status: DISCONTINUED | OUTPATIENT
Start: 2024-11-11 | End: 2024-11-11 | Stop reason: SDUPTHER

## 2024-11-11 RX ORDER — OXYCODONE AND ACETAMINOPHEN 5; 325 MG/1; MG/1
1 TABLET ORAL EVERY 6 HOURS PRN
Qty: 28 TABLET | Refills: 0 | Status: SHIPPED | OUTPATIENT
Start: 2024-11-11 | End: 2024-11-18

## 2024-11-11 RX ORDER — MAGNESIUM HYDROXIDE 1200 MG/15ML
LIQUID ORAL CONTINUOUS PRN
Status: COMPLETED | OUTPATIENT
Start: 2024-11-11 | End: 2024-11-11

## 2024-11-11 RX ORDER — SODIUM CHLORIDE 9 MG/ML
INJECTION, SOLUTION INTRAVENOUS PRN
Status: DISCONTINUED | OUTPATIENT
Start: 2024-11-11 | End: 2024-11-11 | Stop reason: HOSPADM

## 2024-11-11 RX ORDER — DIPHENHYDRAMINE HYDROCHLORIDE 50 MG/ML
12.5 INJECTION INTRAMUSCULAR; INTRAVENOUS
Status: DISCONTINUED | OUTPATIENT
Start: 2024-11-11 | End: 2024-11-11 | Stop reason: HOSPADM

## 2024-11-11 RX ORDER — MIDAZOLAM HYDROCHLORIDE 1 MG/ML
INJECTION, SOLUTION INTRAMUSCULAR; INTRAVENOUS
Status: DISCONTINUED | OUTPATIENT
Start: 2024-11-11 | End: 2024-11-11 | Stop reason: SDUPTHER

## 2024-11-11 RX ORDER — LIDOCAINE HYDROCHLORIDE 10 MG/ML
1 INJECTION, SOLUTION EPIDURAL; INFILTRATION; INTRACAUDAL; PERINEURAL
Status: DISCONTINUED | OUTPATIENT
Start: 2024-11-11 | End: 2024-11-11 | Stop reason: HOSPADM

## 2024-11-11 RX ORDER — BUPIVACAINE HYDROCHLORIDE 2.5 MG/ML
INJECTION, SOLUTION EPIDURAL; INFILTRATION; INTRACAUDAL
Status: DISCONTINUED | OUTPATIENT
Start: 2024-11-11 | End: 2024-11-11 | Stop reason: SDUPTHER

## 2024-11-11 RX ORDER — ONDANSETRON 2 MG/ML
INJECTION INTRAMUSCULAR; INTRAVENOUS
Status: DISCONTINUED | OUTPATIENT
Start: 2024-11-11 | End: 2024-11-11 | Stop reason: SDUPTHER

## 2024-11-11 RX ORDER — DEXAMETHASONE SODIUM PHOSPHATE 10 MG/ML
INJECTION INTRAMUSCULAR; INTRAVENOUS
Status: DISCONTINUED | OUTPATIENT
Start: 2024-11-11 | End: 2024-11-11 | Stop reason: SDUPTHER

## 2024-11-11 RX ORDER — SODIUM CHLORIDE 0.9 % (FLUSH) 0.9 %
5-40 SYRINGE (ML) INJECTION EVERY 12 HOURS SCHEDULED
Status: DISCONTINUED | OUTPATIENT
Start: 2024-11-11 | End: 2024-11-11 | Stop reason: HOSPADM

## 2024-11-11 RX ORDER — FENTANYL CITRATE 0.05 MG/ML
50 INJECTION, SOLUTION INTRAMUSCULAR; INTRAVENOUS EVERY 10 MIN PRN
Status: DISCONTINUED | OUTPATIENT
Start: 2024-11-11 | End: 2024-11-11 | Stop reason: HOSPADM

## 2024-11-11 RX ORDER — FENTANYL CITRATE 50 UG/ML
INJECTION, SOLUTION INTRAMUSCULAR; INTRAVENOUS
Status: DISCONTINUED | OUTPATIENT
Start: 2024-11-11 | End: 2024-11-11 | Stop reason: SDUPTHER

## 2024-11-11 RX ORDER — METOCLOPRAMIDE HYDROCHLORIDE 5 MG/ML
10 INJECTION INTRAMUSCULAR; INTRAVENOUS
Status: DISCONTINUED | OUTPATIENT
Start: 2024-11-11 | End: 2024-11-11 | Stop reason: HOSPADM

## 2024-11-11 RX ORDER — VANCOMYCIN HYDROCHLORIDE 1 G/20ML
INJECTION, POWDER, LYOPHILIZED, FOR SOLUTION INTRAVENOUS PRN
Status: DISCONTINUED | OUTPATIENT
Start: 2024-11-11 | End: 2024-11-11 | Stop reason: ALTCHOICE

## 2024-11-11 RX ORDER — NALOXONE HYDROCHLORIDE 0.4 MG/ML
INJECTION, SOLUTION INTRAMUSCULAR; INTRAVENOUS; SUBCUTANEOUS PRN
Status: DISCONTINUED | OUTPATIENT
Start: 2024-11-11 | End: 2024-11-11 | Stop reason: HOSPADM

## 2024-11-11 RX ADMIN — SODIUM CHLORIDE, POTASSIUM CHLORIDE, SODIUM LACTATE AND CALCIUM CHLORIDE: 600; 310; 30; 20 INJECTION, SOLUTION INTRAVENOUS at 07:30

## 2024-11-11 RX ADMIN — PROPOFOL 200 MG: 10 INJECTION, EMULSION INTRAVENOUS at 07:36

## 2024-11-11 RX ADMIN — SODIUM CHLORIDE, POTASSIUM CHLORIDE, SODIUM LACTATE AND CALCIUM CHLORIDE: 600; 310; 30; 20 INJECTION, SOLUTION INTRAVENOUS at 06:45

## 2024-11-11 RX ADMIN — MIDAZOLAM HYDROCHLORIDE 2 MG: 1 INJECTION, SOLUTION INTRAMUSCULAR; INTRAVENOUS at 07:00

## 2024-11-11 RX ADMIN — ONDANSETRON 4 MG: 2 INJECTION, SOLUTION INTRAMUSCULAR; INTRAVENOUS at 11:03

## 2024-11-11 RX ADMIN — SUGAMMADEX 200 MG: 100 INJECTION, SOLUTION INTRAVENOUS at 11:25

## 2024-11-11 RX ADMIN — ROCURONIUM BROMIDE 15 MG: 10 INJECTION, SOLUTION INTRAVENOUS at 10:38

## 2024-11-11 RX ADMIN — CEFAZOLIN 2000 MG: 2 INJECTION, POWDER, FOR SOLUTION INTRAMUSCULAR; INTRAVENOUS at 07:45

## 2024-11-11 RX ADMIN — PROPOFOL 50 MCG/KG/MIN: 10 INJECTION, EMULSION INTRAVENOUS at 08:11

## 2024-11-11 RX ADMIN — ROCURONIUM BROMIDE 20 MG: 10 INJECTION, SOLUTION INTRAVENOUS at 08:08

## 2024-11-11 RX ADMIN — BUPIVACAINE HYDROCHLORIDE 30 ML: 2.5 INJECTION, SOLUTION EPIDURAL; INFILTRATION; INTRACAUDAL at 07:05

## 2024-11-11 RX ADMIN — ROCURONIUM BROMIDE 10 MG: 10 INJECTION, SOLUTION INTRAVENOUS at 09:30

## 2024-11-11 RX ADMIN — DEXAMETHASONE SODIUM PHOSPHATE 10 MG: 10 INJECTION INTRAMUSCULAR; INTRAVENOUS at 09:20

## 2024-11-11 RX ADMIN — PROPOFOL 30 MG: 10 INJECTION, EMULSION INTRAVENOUS at 08:09

## 2024-11-11 RX ADMIN — FENTANYL CITRATE 50 MCG: 50 INJECTION, SOLUTION INTRAMUSCULAR; INTRAVENOUS at 07:43

## 2024-11-11 RX ADMIN — FENTANYL CITRATE 50 MCG: 50 INJECTION, SOLUTION INTRAMUSCULAR; INTRAVENOUS at 07:35

## 2024-11-11 RX ADMIN — ROCURONIUM BROMIDE 20 MG: 10 INJECTION, SOLUTION INTRAVENOUS at 09:45

## 2024-11-11 RX ADMIN — ROCURONIUM BROMIDE 50 MG: 10 INJECTION, SOLUTION INTRAVENOUS at 07:37

## 2024-11-11 RX ADMIN — BUPIVACAINE HYDROCHLORIDE 30 ML: 2.5 INJECTION, SOLUTION EPIDURAL; INFILTRATION; INTRACAUDAL at 07:02

## 2024-11-11 RX ADMIN — ROCURONIUM BROMIDE 10 MG: 10 INJECTION, SOLUTION INTRAVENOUS at 10:10

## 2024-11-11 ASSESSMENT — PAIN DESCRIPTION - ORIENTATION
ORIENTATION: RIGHT

## 2024-11-11 ASSESSMENT — PAIN DESCRIPTION - DESCRIPTORS
DESCRIPTORS: ACHING
DESCRIPTORS: ACHING;THROBBING

## 2024-11-11 ASSESSMENT — PAIN DESCRIPTION - LOCATION
LOCATION: FOOT

## 2024-11-11 ASSESSMENT — PAIN SCALES - GENERAL
PAINLEVEL_OUTOF10: 4
PAINLEVEL_OUTOF10: 5
PAINLEVEL_OUTOF10: 0
PAINLEVEL_OUTOF10: 8

## 2024-11-11 ASSESSMENT — PAIN - FUNCTIONAL ASSESSMENT: PAIN_FUNCTIONAL_ASSESSMENT: 0-10

## 2024-11-11 NOTE — ANESTHESIA PROCEDURE NOTES
Peripheral Block    Patient location during procedure: pre-op  Reason for block: post-op pain management and at surgeon's request  Start time: 11/11/2024 7:00 AM  Staffing  Performed: anesthesiologist   Anesthesiologist: Mike Lovelace MD  Performed by: Mike Lovelace MD  Authorized by: Mike Lovelace MD    Preanesthetic Checklist  Completed: patient identified, IV checked, site marked, risks and benefits discussed, surgical/procedural consents, equipment checked, pre-op evaluation, timeout performed, anesthesia consent given, oxygen available and monitors applied/VS acknowledged  Peripheral Block   Patient position: prone  Prep: ChloraPrep  Provider prep: mask and sterile gloves (Sterile probe cover)  Patient monitoring: cardiac monitor, continuous pulse ox, frequent blood pressure checks and IV access  Block type: Sciatic  Popliteal  Laterality: right  Injection technique: single-shot  Guidance: ultrasound guided  Local infiltration: bupivacaine  Infiltration strength: 0.25 %  Local infiltration: bupivacaine  Dose: 30 mL    Needle   Needle type: combined needle/nerve stimulator   Needle gauge: 22 G  Needle localization: anatomical landmarks, ultrasound guidance and nerve stimulator  Test dose: negative  Needle length: 5 cm  Assessment   Injection assessment: negative aspiration for heme, no paresthesia on injection and local visualized surrounding nerve on ultrasound  Paresthesia pain: immediately resolved  Slow fractionated injection: yes  Hemodynamics: stable  Outcomes: uncomplicated and patient tolerated procedure well    Additional Notes  Ultrasound guidance used to view needle for placement.    Ultrasound image stored,  printed and saved in patient chart.    Sterile probe cover used

## 2024-11-11 NOTE — ANESTHESIA PROCEDURE NOTES
Peripheral Block    Patient location during procedure: pre-op  Reason for block: post-op pain management and at surgeon's request  Start time: 11/11/2024 7:10 AM  Staffing  Performed: anesthesiologist   Anesthesiologist: Mike Lovelace MD  Performed by: Mike Lovelace MD  Authorized by: Mike Lovelace MD    Preanesthetic Checklist  Completed: patient identified, IV checked, site marked, risks and benefits discussed, surgical/procedural consents, equipment checked, pre-op evaluation, timeout performed, anesthesia consent given, oxygen available and monitors applied/VS acknowledged  Peripheral Block   Patient position: supine  Prep: ChloraPrep  Provider prep: mask and sterile gloves (Sterile probe cover)  Patient monitoring: cardiac monitor, continuous pulse ox, frequent blood pressure checks and IV access  Block type: Saphenous  Laterality: right  Injection technique: single-shot  Guidance: ultrasound guided  Local infiltration: bupivacaine  Infiltration strength: 0.25 %  Local infiltration: bupivacaine  Dose: 30 mL    Needle   Needle type: combined needle/nerve stimulator   Needle gauge: 22 G  Needle localization: anatomical landmarks and ultrasound guidance  Test dose: negative  Needle length: 5 cm  Assessment   Injection assessment: negative aspiration for heme, no paresthesia on injection and local visualized surrounding nerve on ultrasound  Paresthesia pain: immediately resolved  Slow fractionated injection: yes  Hemodynamics: stable  Outcomes: uncomplicated and patient tolerated procedure well    Additional Notes  Ultrasound guidance used to view needle for placement.    Ultrasound image stored,  printed and saved in patient chart.    Sterile probe cover used

## 2024-11-11 NOTE — ANESTHESIA POSTPROCEDURE EVALUATION
Department of Anesthesiology  Postprocedure Note    Patient: Mike Carbajal  MRN: [id number]  YOB: 1990  Date of evaluation: 11/11/2024    Procedure Summary       Date: 11/11/24 Room / Location: [address]    Anesthesia Start: 0730 Anesthesia Stop: 1157    Procedure: OPEN REDUCTION INTERNAL FIXATION CALCANEAL RIGHT, APPLICATION OF POSTERIOR SPLINT RIGHT, LAY CALCANEAL FRACTURE, PLATE, PAT ON ADMIT, H&P BY RESIDENT, ART RICE. C-ARM (Right) Diagnosis:       Closed displaced fracture of body of right calcaneus, initial encounter      (Closed displaced fracture of body of right calcaneus, initial encounter [S92.011A])    Surgeons: Anil Anand DPM Responsible Provider: Mike Lovelace MD    Anesthesia Type: general ASA Status: 2            Anesthesia Type: No value filed.    Lisa Phase I: Lisa Score: 10    Lisa Phase II: Lisa Score: 10    Anesthesia Post Evaluation    Patient location during evaluation: PACU  Patient participation: complete - patient participated  Level of consciousness: responsive to verbal stimuli  Pain score: 0  Airway patency: patent  Nausea & Vomiting: no nausea and no vomiting  Cardiovascular status: blood pressure returned to baseline and hemodynamically stable  Respiratory status: acceptable and face mask  Hydration status: euvolemic  Multimodal analgesia pain management approach  Pain management: adequate        No notable events documented.

## 2024-11-11 NOTE — PROGRESS NOTES
Pt discharged via wheelchair to girl friend.  All personal belongings and discharge instructions taken with patient.

## 2024-11-11 NOTE — PROGRESS NOTES
CLINICAL PHARMACY NOTE: MEDS TO BEDS    Total # of Prescriptions Filled: 1   The following medications were delivered to the patient:  Oxycodone/APAP 5-325 mg tab      Additional Documentation:  Patient is going to get Aspirin 325 mg tab OTC.

## 2024-11-11 NOTE — BRIEF OP NOTE
*    Findings:  Infection Present At Time Of Surgery (PATOS) (choose all levels that have infection present):  No infection present    Electronically signed by Anil Anand DPM on 11/11/2024 at 11:35 AM

## 2024-11-11 NOTE — ANESTHESIA PRE PROCEDURE
Department of Anesthesiology  Preprocedure Note       Name:  Mike Carbajal   Age:  34 y.o.  :  1990                                          MRN:  [id number]         Date:  2024      Surgeon: Surgeon(s):  Anil Anand DPM    Procedure: Procedure(s):  OPEN REDUCTION INTERNAL FIXATION CALCANEAL RIGHT, APPLICATION OF POSTERIOR SPLINT RIGHT, LAY CALCANEAL FRACTURE, PLATE, PAT ON ADMIT, H&P BY RESIDENT, ART RICE. C-ARM    Medications prior to admission:   Prior to Admission medications    Medication Sig Start Date End Date Taking? Authorizing Provider   naproxen (NAPROSYN) 500 MG tablet Take 1 tablet by mouth 2 times daily (with meals) for 7 days 10/27/24 11/3/24  Jerson Cisneros PA-C   ondansetron (ZOFRAN) 4 MG tablet Take 1 tablet by mouth 3 times daily as needed for Nausea or Vomiting 10/27/24 11/17/24  Jerson Cisneros PA-C       Current medications:    Current Facility-Administered Medications   Medication Dose Route Frequency Provider Last Rate Last Admin   • lidocaine PF 1 % injection 1 mL  1 mL IntraDERmal Once PRN Mike Lovelace MD       • lactated ringers infusion   IntraVENous Continuous Mike Lovelace  mL/hr at 24 0645 New Bag at 24 0645   • sodium chloride flush 0.9 % injection 5-40 mL  5-40 mL IntraVENous 2 times per day Mike Lovelace MD       • sodium chloride flush 0.9 % injection 5-40 mL  5-40 mL IntraVENous PRN Mike Lovelace MD       • 0.9 % sodium chloride infusion   IntraVENous PRN Mike Lovelace MD       • ceFAZolin (ANCEF) 2,000 mg in sterile water 20 mL IV syringe  2,000 mg IntraVENous Once Anil Anand DPM           Allergies:  No Known Allergies    Problem List:    Patient Active Problem List   Diagnosis Code   • Surgery, elective Z41.9       Past Medical History:  History reviewed. No pertinent past medical history.    Past Surgical History:        Procedure Laterality Date   •

## 2024-11-11 NOTE — OP NOTE
Operative Note      Patient: Mike Carbajal  YOB: 1990  MRN: [id number]    Date of Procedure: 11/11/2024    Pre-Op Diagnosis Codes:      * Closed displaced fracture of body of right calcaneus, initial encounter [S92.011A]    Post-Op Diagnosis: Same       Procedure:     Open reduction internal fixation calcaneal fracture right  Stress radiographs right foot    Surgeon(s):  Anil Anand DPM    Assistant:   First Assistant: Kym Mcgrath DPM    Anesthesia: General    Estimated Blood Loss (mL): 150 mL    Complications: None    Specimens:   * No specimens in log *    Implants:  Implant Name Type Inv. Item Serial No.  Lot No. LRB No. Used Action   GRAFT BNE SUB 5CC B TRICALCIUM PHSPTE VERSATILE ULT POR - [id number]  GRAFT BNE SUB 5CC B TRICALCIUM PHSPTE VERSATILE ULT POR  [employer] [facility]  Right 1 Implanted   SCREW BNE L42MM DIA4MM TI ALLY SELF DRL ST SHELL PARTIALLY - [id number]  SCREW BNE L42MM DIA4MM TI ALLY SELF DRL ST SHELL PARTIALLY  [employer] [facility]  Right 1 Implanted   SCREW SHELL PTHRD ASNIS III 4.0X55MM - [id number]  SCREW SHELL PTHRD ASNIS III 4.0X55MM  [employer] [facility]  Right 1 Implanted   PLATE BNE M CALCNL MESH VARIAX - [id number]  PLATE BNE M CALCNL MESH VARIAX  [employer] [facility]  Right 1 Implanted   SCREW BNE L26MM DIA3.5MM CANC TI JIM FULL THRD T10 DRV FOR - [id number]  SCREW BNE L26MM DIA3.5MM CANC TI JIM FULL THRD T10 DRV FOR  [employer] [facility]  Right 1 Implanted   SCREW BNE L28MM DIA3.5MM TI ALLY JIM FULL THRD T10 DRV - [id number]  SCREW BNE L28MM DIA3.5MM TI ALLY JIM FULL THRD T10 DRV  [employer] [facility]  Right 3 Implanted   SCREW 3.5MM L30MM JIM FT T10 - [id number]  SCREW 3.5MM L30MM JIM FT T10  [employer] [facility]  Right 5 Implanted   SCREW BNE L32MM OD3.5MM JIM FULL THRD T10 DONAL - [id number]  SCREW BNE L32MM OD3.5MM JIM FULL THRD T10 DONAL  [id number]  Right 3 Implanted

## 2024-11-11 NOTE — DISCHARGE INSTRUCTIONS
POST OPERATIVE INSTRUCTIONS      RESTRICTIONS AFTER ANESTHESIA:   - Do not drive, work with heavy equipment or sign legal documents for 24 hours  - Rest at home for 24 hours following anesthesia  - You may experience light-headedness, dizziness, nausea, or sleepiness after surgery.Stay in bed if you experience these symptoms  - Do not stay alone. A responsible adult must be with you for 24 hours.   - Do not take any alcoholic beverages or sleeping pills for the next 18 hours  - You may experience muscle aches and sore throat  - If you experience difficulty breathing and/or shortness of breath, seek IMMEDIATE    medical attention.     DRESSING: Keep surgical area clean and dry. Do NOT remove dressing. You may notice blood upon your bandage. Bleeding is expected and your bandage may become stained. You may reinforce with gauze or ace bandage.     BATHING: Sponge bath only or use of a cast protector in the shower until first post op visit. If your dressing becomes saturated, please notify your surgeon's office as soon as possible. Your dressing will need to be changed in office.    ACTIVITY:   - non weight bearing in posterior splint  - After discharge from the surgery center, go directly home and limit your activities.  - Keep children and pets away from your operative foot.  - No heavy lifting.      ELEVATION: Elevate the operative site above the level of your heart for at least the next 3 days. This will help decrease pain and prevent swelling. Support the back of your knee with a pillow.     ICE: Use ice pack behind right knee or at right ankle for 20 minutes on and off every hour when awake for the next 3 days. Do NOT fall asleep while using the ice pack.     MEDICATION:   - Some degree of discomfort is to be expected after surgery and will improve gradually as the healing process continues. Pain medication has been prescribed for you.   - Please take your pain medication as prescribed. Take with food. Set

## 2025-03-27 DIAGNOSIS — S92.064A CLOSED NONDISPLACED INTRA-ARTICULAR FRACTURE OF RIGHT CALCANEUS, INITIAL ENCOUNTER: ICD-10-CM

## 2025-03-27 DIAGNOSIS — Z41.9 SURGERY, ELECTIVE: Primary | ICD-10-CM

## 2025-04-07 ENCOUNTER — TELEPHONE (OUTPATIENT)
Dept: ADMINISTRATIVE | Age: 35
End: 2025-04-07

## 2025-04-07 NOTE — TELEPHONE ENCOUNTER
Peer to Peer is needed for CT R Ankle. P2P is available through 4/10/25.       Reference/Tracking #: [id number]    Rationale:   Missing: any notes from your doctor that explain your problem. We did not receive any information about you at all. Not only that, but we need to know what kind of problem you are having, what your doctor found  when they checked you, and what has been done to help you.    Peer to Peer Information:     If you would like to discuss this case with a Clinical  Peer Reviewer, please call the Utilization Management department at [phone]. This is available through 4/10/2025 only.     If there is a change in patient's care plan, please contact the patient.    Thank you for your attention,  LAURIE Willis, MSN, RN

## 2025-05-02 ENCOUNTER — HOSPITAL ENCOUNTER (OUTPATIENT)
Dept: CT IMAGING | Age: 35
Discharge: HOME | End: 2025-05-02
Attending: STUDENT IN AN ORGANIZED HEALTH CARE EDUCATION/TRAINING PROGRAM
Payer: COMMERCIAL

## 2025-05-02 DIAGNOSIS — S92.064A CLOSED NONDISPLACED INTRA-ARTICULAR FRACTURE OF RIGHT CALCANEUS, INITIAL ENCOUNTER: ICD-10-CM

## 2025-05-02 PROCEDURE — 73700 CT LOWER EXTREMITY W/O DYE: CPT

## (undated) DEVICE — BLADE,CARBON-STEEL,15,STRL,DISPOSABLE,TB: Brand: MEDLINE

## (undated) DEVICE — DRESSING GZ W1XL8IN COT XRFRM N ADH OVERWRAP CURAD

## (undated) DEVICE — DRILL BIT, AO DIA2.6MM X 135MM, SCALED: Brand: VARIAX

## (undated) DEVICE — LOWER EXTREMITY: Brand: MEDLINE INDUSTRIES, INC.

## (undated) DEVICE — CANNULATED SCREW
Type: IMPLANTABLE DEVICE | Site: FOOT | Status: NON-FUNCTIONAL
Brand: ASNIS

## (undated) DEVICE — SUTURE MONOCRYL SZ 3-0 L27IN ABSRB UD L19MM PS-2 3/8 CIR PRIM Y427H

## (undated) DEVICE — KIRSCHNER WIRE

## (undated) DEVICE — HYPODERMIC SAFETY NEEDLE: Brand: MAGELLAN

## (undated) DEVICE — Device

## (undated) DEVICE — ZIMMER® STERILE DISPOSABLE TOURNIQUET CUFF, DUAL PORT, SINGLE BLADDER, 18 IN. (46 CM)

## (undated) DEVICE — GOWN,AURORA,NONREINFORCED,LARGE: Brand: MEDLINE

## (undated) DEVICE — NEPTUNE E-SEP SMOKE EVACUATION PENCIL, COATED, 70MM BLADE, PUSH BUTTON SWITCH: Brand: NEPTUNE E-SEP

## (undated) DEVICE — SUTURE MONOCRYL SZ 4-0 L27IN ABSRB UD L19MM PS-2 1/2 CIR PRIM Y426H

## (undated) DEVICE — SUTURE MONOCRYL SZ 3-0 L27IN ABSRB UD L26MM SH 1/2 CIR Y416H

## (undated) DEVICE — UNTHREADED GUIDE WIRE: Brand: FIXOS

## (undated) DEVICE — SUTURE ETHILON SZ 3-0 L18IN NONABSORBABLE BLK PS-2 L19MM 3/8 1669H

## (undated) DEVICE — HOLDING PIN: Brand: ANCHORAGE

## (undated) DEVICE — STRIP,CLOSURE,WOUND,MEDI-STRIP,1/2X4: Brand: MEDLINE

## (undated) DEVICE — SPONGE,LAP,18"X18",DLX,XR,ST,5/PK,40/PK: Brand: MEDLINE

## (undated) DEVICE — CANNULATED DRILL

## (undated) DEVICE — PADDING,UNDERCAST,COTTON, 4"X4YD STERILE: Brand: MEDLINE

## (undated) DEVICE — SUTURE MCRYL SZ 3-0 L27IN ABSRB UD L19MM PS-2 3/8 CIR PRIM Y427H

## (undated) DEVICE — SPLINT CAST W3XL15IN GRN STRENGTH PLSTR OF PARIS FAST SET

## (undated) DEVICE — PAD,ABDOMINAL,8"X10",ST,LF: Brand: MEDLINE

## (undated) DEVICE — SINGLE PORT MANIFOLD: Brand: NEPTUNE 2

## (undated) DEVICE — C-ARM: Brand: UNBRANDED

## (undated) DEVICE — PADDING CAST W4INXL4YD SPUN DACRON POLY POR NON STERILE

## (undated) DEVICE — PADDING CAST N ADH 4 YDX3 IN HIGHLY ABSORBENT EZ APPL SOFROL

## (undated) DEVICE — WRAP COHESIVE W2INXL5YD TAN SELF ADH BNDG HND NON STERILE TEAR CARING

## (undated) DEVICE — SYRINGE IRRIG 60ML SFT PLIABLE BLB EZ TO GRP 1 HND USE W/

## (undated) DEVICE — TUBING, SUCTION, 9/32" X 12', STRAIGHT: Brand: MEDLINE INDUSTRIES, INC.

## (undated) DEVICE — GLOVE ORTHO 7 1/2   MSG9475

## (undated) DEVICE — 1010 S-DRAPE TOWEL DRAPE 10/BX: Brand: STERI-DRAPE™

## (undated) DEVICE — GUIDEWIRE ORTHOPEDIC 1.4X150 MM TROCAR PT 1 END

## (undated) DEVICE — GLOVE SURG SZ 65 THK91MIL LTX FREE SYN POLYISOPRENE

## (undated) DEVICE — SKIN PREP TRAY 4 COMPARTM TRAY: Brand: MEDLINE INDUSTRIES, INC.

## (undated) DEVICE — SPLINT ORTH DBL SIDE 15 FTX3 IN RL PADDED FIBERGLASS LF

## (undated) DEVICE — SUTURE MCRYL SZ 4-0 L27IN ABSRB UD L19MM PS-2 1/2 CIR PRIM Y426H

## (undated) DEVICE — HAND: Brand: MEDLINE INDUSTRIES, INC.